# Patient Record
Sex: FEMALE | Race: WHITE | NOT HISPANIC OR LATINO | Employment: OTHER | URBAN - NONMETROPOLITAN AREA
[De-identification: names, ages, dates, MRNs, and addresses within clinical notes are randomized per-mention and may not be internally consistent; named-entity substitution may affect disease eponyms.]

---

## 2018-05-08 ENCOUNTER — PREP FOR SURGERY (OUTPATIENT)
Dept: OTHER | Facility: HOSPITAL | Age: 83
End: 2018-05-08

## 2018-05-08 ENCOUNTER — APPOINTMENT (OUTPATIENT)
Dept: GENERAL RADIOLOGY | Facility: HOSPITAL | Age: 83
End: 2018-05-08

## 2018-05-08 ENCOUNTER — HOSPITAL ENCOUNTER (INPATIENT)
Facility: HOSPITAL | Age: 83
LOS: 4 days | Discharge: NURSING, REHAB OR LONG-TERM CARE | End: 2018-05-12
Attending: INTERNAL MEDICINE | Admitting: INTERNAL MEDICINE

## 2018-05-08 ENCOUNTER — APPOINTMENT (OUTPATIENT)
Dept: ULTRASOUND IMAGING | Facility: HOSPITAL | Age: 83
End: 2018-05-08

## 2018-05-08 DIAGNOSIS — Z87.19: ICD-10-CM

## 2018-05-08 DIAGNOSIS — K83.8 COMMON BILE DUCT DILATATION: Primary | ICD-10-CM

## 2018-05-08 PROBLEM — A41.9 SEPSIS (HCC): Status: ACTIVE | Noted: 2018-05-08

## 2018-05-08 LAB
ALBUMIN SERPL-MCNC: 3.4 G/DL (ref 3.4–4.8)
ALBUMIN/GLOB SERPL: 0.9 G/DL (ref 1.5–2.5)
ALP SERPL-CCNC: 267 U/L (ref 35–104)
ALT SERPL W P-5'-P-CCNC: 61 U/L (ref 10–36)
ANION GAP SERPL CALCULATED.3IONS-SCNC: 8.2 MMOL/L (ref 3.6–11.2)
AST SERPL-CCNC: 60 U/L (ref 10–30)
BASOPHILS # BLD AUTO: 0.01 10*3/MM3 (ref 0–0.3)
BASOPHILS NFR BLD AUTO: 0.1 % (ref 0–2)
BILIRUB SERPL-MCNC: 0.6 MG/DL (ref 0.2–1.8)
BUN BLD-MCNC: 36 MG/DL (ref 7–21)
BUN/CREAT SERPL: 31.9 (ref 7–25)
CALCIUM SPEC-SCNC: 8.9 MG/DL (ref 7.7–10)
CHLORIDE SERPL-SCNC: 103 MMOL/L (ref 99–112)
CO2 SERPL-SCNC: 24.8 MMOL/L (ref 24.3–31.9)
CREAT BLD-MCNC: 1.13 MG/DL (ref 0.43–1.29)
CRP SERPL-MCNC: 9.47 MG/DL (ref 0–0.99)
D-LACTATE SERPL-SCNC: 0.9 MMOL/L (ref 0.5–2)
DEPRECATED RDW RBC AUTO: 49.8 FL (ref 37–54)
EOSINOPHIL # BLD AUTO: 0 10*3/MM3 (ref 0–0.7)
EOSINOPHIL NFR BLD AUTO: 0 % (ref 0–7)
ERYTHROCYTE [DISTWIDTH] IN BLOOD BY AUTOMATED COUNT: 14.1 % (ref 11.5–14.5)
FOLATE SERPL-MCNC: 12.63 NG/ML (ref 5.4–20)
GFR SERPL CREATININE-BSD FRML MDRD: 45 ML/MIN/1.73
GLOBULIN UR ELPH-MCNC: 4 GM/DL
GLUCOSE BLD-MCNC: 122 MG/DL (ref 70–110)
HCT VFR BLD AUTO: 32.6 % (ref 37–47)
HGB BLD-MCNC: 10.4 G/DL (ref 12–16)
IMM GRANULOCYTES # BLD: 0.06 10*3/MM3 (ref 0–0.03)
IMM GRANULOCYTES NFR BLD: 0.4 % (ref 0–0.5)
LYMPHOCYTES # BLD AUTO: 1.3 10*3/MM3 (ref 1–3)
LYMPHOCYTES NFR BLD AUTO: 9.2 % (ref 16–46)
MCH RBC QN AUTO: 32.1 PG (ref 27–33)
MCHC RBC AUTO-ENTMCNC: 31.9 G/DL (ref 33–37)
MCV RBC AUTO: 100.6 FL (ref 80–94)
MONOCYTES # BLD AUTO: 1.24 10*3/MM3 (ref 0.1–0.9)
MONOCYTES NFR BLD AUTO: 8.8 % (ref 0–12)
NEUTROPHILS # BLD AUTO: 11.47 10*3/MM3 (ref 1.4–6.5)
NEUTROPHILS NFR BLD AUTO: 81.5 % (ref 40–75)
OSMOLALITY SERPL CALC.SUM OF ELEC: 281.6 MOSM/KG (ref 273–305)
PLATELET # BLD AUTO: 328 10*3/MM3 (ref 130–400)
PMV BLD AUTO: 9.4 FL (ref 6–10)
POTASSIUM BLD-SCNC: 3.6 MMOL/L (ref 3.5–5.3)
PROT SERPL-MCNC: 7.4 G/DL (ref 6–8)
RBC # BLD AUTO: 3.24 10*6/MM3 (ref 4.2–5.4)
SODIUM BLD-SCNC: 136 MMOL/L (ref 135–153)
TSH SERPL DL<=0.05 MIU/L-ACNC: 1.33 MIU/ML (ref 0.55–4.78)
VIT B12 BLD-MCNC: 913 PG/ML (ref 211–911)
WBC NRBC COR # BLD: 14.08 10*3/MM3 (ref 4.5–12.5)

## 2018-05-08 PROCEDURE — 85025 COMPLETE CBC W/AUTO DIFF WBC: CPT | Performed by: INTERNAL MEDICINE

## 2018-05-08 PROCEDURE — 80053 COMPREHEN METABOLIC PANEL: CPT | Performed by: INTERNAL MEDICINE

## 2018-05-08 PROCEDURE — 93005 ELECTROCARDIOGRAM TRACING: CPT | Performed by: INTERNAL MEDICINE

## 2018-05-08 PROCEDURE — 99223 1ST HOSP IP/OBS HIGH 75: CPT | Performed by: INTERNAL MEDICINE

## 2018-05-08 PROCEDURE — 76700 US EXAM ABDOM COMPLETE: CPT

## 2018-05-08 PROCEDURE — 87040 BLOOD CULTURE FOR BACTERIA: CPT | Performed by: INTERNAL MEDICINE

## 2018-05-08 PROCEDURE — 76700 US EXAM ABDOM COMPLETE: CPT | Performed by: RADIOLOGY

## 2018-05-08 PROCEDURE — 84443 ASSAY THYROID STIM HORMONE: CPT | Performed by: INTERNAL MEDICINE

## 2018-05-08 PROCEDURE — 83605 ASSAY OF LACTIC ACID: CPT | Performed by: INTERNAL MEDICINE

## 2018-05-08 PROCEDURE — 82607 VITAMIN B-12: CPT | Performed by: INTERNAL MEDICINE

## 2018-05-08 PROCEDURE — 25010000002 PIPERACILLIN-TAZOBACTAM: Performed by: PHYSICIAN ASSISTANT

## 2018-05-08 PROCEDURE — 82746 ASSAY OF FOLIC ACID SERUM: CPT | Performed by: INTERNAL MEDICINE

## 2018-05-08 PROCEDURE — 86140 C-REACTIVE PROTEIN: CPT | Performed by: INTERNAL MEDICINE

## 2018-05-08 PROCEDURE — 71045 X-RAY EXAM CHEST 1 VIEW: CPT

## 2018-05-08 PROCEDURE — 93010 ELECTROCARDIOGRAM REPORT: CPT | Performed by: INTERNAL MEDICINE

## 2018-05-08 PROCEDURE — 71045 X-RAY EXAM CHEST 1 VIEW: CPT | Performed by: RADIOLOGY

## 2018-05-08 RX ORDER — ACETAMINOPHEN 325 MG/1
650 TABLET ORAL EVERY 6 HOURS PRN
Status: DISCONTINUED | OUTPATIENT
Start: 2018-05-08 | End: 2018-05-12 | Stop reason: HOSPADM

## 2018-05-08 RX ORDER — SODIUM CHLORIDE 9 MG/ML
75 INJECTION, SOLUTION INTRAVENOUS CONTINUOUS
Status: CANCELLED | OUTPATIENT
Start: 2018-05-08

## 2018-05-08 RX ORDER — NITROGLYCERIN 0.4 MG/1
0.4 TABLET SUBLINGUAL
Status: DISCONTINUED | OUTPATIENT
Start: 2018-05-08 | End: 2018-05-12 | Stop reason: HOSPADM

## 2018-05-08 RX ORDER — ACETAMINOPHEN 500 MG
1000 TABLET ORAL
COMMUNITY

## 2018-05-08 RX ORDER — NITROGLYCERIN 0.1MG/HR
1 PATCH, TRANSDERMAL 24 HOURS TRANSDERMAL DAILY
COMMUNITY

## 2018-05-08 RX ORDER — DOXEPIN HYDROCHLORIDE 25 MG/1
50 CAPSULE ORAL 2 TIMES DAILY
Status: DISCONTINUED | OUTPATIENT
Start: 2018-05-08 | End: 2018-05-12 | Stop reason: HOSPADM

## 2018-05-08 RX ORDER — NITROGLYCERIN 0.4 MG/1
0.4 TABLET SUBLINGUAL
Status: ON HOLD | COMMUNITY
End: 2018-05-08 | Stop reason: ALTCHOICE

## 2018-05-08 RX ORDER — LEVOTHYROXINE SODIUM 0.1 MG/1
100 TABLET ORAL DAILY
Status: DISCONTINUED | OUTPATIENT
Start: 2018-05-08 | End: 2018-05-12 | Stop reason: HOSPADM

## 2018-05-08 RX ORDER — ASPIRIN AND DIPYRIDAMOLE 25; 200 MG/1; MG/1
1 CAPSULE, EXTENDED RELEASE ORAL 2 TIMES DAILY
COMMUNITY

## 2018-05-08 RX ORDER — NITROGLYCERIN 0.1MG/HR
1 PATCH, TRANSDERMAL 24 HOURS TRANSDERMAL DAILY
Status: CANCELLED | OUTPATIENT
Start: 2018-05-08

## 2018-05-08 RX ORDER — ACETAMINOPHEN 650 MG/1
650 SUPPOSITORY RECTAL EVERY 4 HOURS PRN
Status: CANCELLED | OUTPATIENT
Start: 2018-05-08

## 2018-05-08 RX ORDER — HYDROCODONE BITARTRATE AND ACETAMINOPHEN 10; 325 MG/1; MG/1
1 TABLET ORAL 2 TIMES DAILY
Status: CANCELLED | OUTPATIENT
Start: 2018-05-08

## 2018-05-08 RX ORDER — DOCUSATE SODIUM 100 MG/1
100 CAPSULE, LIQUID FILLED ORAL DAILY
Status: CANCELLED | OUTPATIENT
Start: 2018-05-08

## 2018-05-08 RX ORDER — CARVEDILOL 3.12 MG/1
3.12 TABLET ORAL 2 TIMES DAILY
COMMUNITY

## 2018-05-08 RX ORDER — HEPARIN SODIUM 5000 [USP'U]/ML
5000 INJECTION, SOLUTION INTRAVENOUS; SUBCUTANEOUS EVERY 12 HOURS SCHEDULED
Status: DISCONTINUED | OUTPATIENT
Start: 2018-05-08 | End: 2018-05-08

## 2018-05-08 RX ORDER — ASPIRIN AND DIPYRIDAMOLE 25; 200 MG/1; MG/1
1 CAPSULE, EXTENDED RELEASE ORAL 2 TIMES DAILY
Status: CANCELLED | OUTPATIENT
Start: 2018-05-08

## 2018-05-08 RX ORDER — FERROUS SULFATE 325(65) MG
325 TABLET ORAL 2 TIMES DAILY
Status: DISCONTINUED | OUTPATIENT
Start: 2018-05-08 | End: 2018-05-09

## 2018-05-08 RX ORDER — POVIDONE-IODINE 10 MG/G
OINTMENT TOPICAL EVERY 12 HOURS SCHEDULED
Status: DISCONTINUED | OUTPATIENT
Start: 2018-05-08 | End: 2018-05-12 | Stop reason: HOSPADM

## 2018-05-08 RX ORDER — SODIUM CHLORIDE 9 MG/ML
75 INJECTION, SOLUTION INTRAVENOUS CONTINUOUS
Status: DISCONTINUED | OUTPATIENT
Start: 2018-05-08 | End: 2018-05-12 | Stop reason: HOSPADM

## 2018-05-08 RX ORDER — FUROSEMIDE 40 MG/1
40 TABLET ORAL 2 TIMES DAILY
Status: CANCELLED | OUTPATIENT
Start: 2018-05-08

## 2018-05-08 RX ORDER — ACETAMINOPHEN 500 MG
1000 TABLET ORAL NIGHTLY
Status: CANCELLED | OUTPATIENT
Start: 2018-05-08

## 2018-05-08 RX ORDER — CARVEDILOL 3.12 MG/1
3.12 TABLET ORAL 2 TIMES DAILY
Status: CANCELLED | OUTPATIENT
Start: 2018-05-08

## 2018-05-08 RX ORDER — SODIUM CHLORIDE 0.9 % (FLUSH) 0.9 %
1-10 SYRINGE (ML) INJECTION AS NEEDED
Status: DISCONTINUED | OUTPATIENT
Start: 2018-05-08 | End: 2018-05-12 | Stop reason: HOSPADM

## 2018-05-08 RX ORDER — ACETAMINOPHEN 650 MG/1
650 SUPPOSITORY RECTAL EVERY 4 HOURS PRN
Status: DISCONTINUED | OUTPATIENT
Start: 2018-05-08 | End: 2018-05-12 | Stop reason: HOSPADM

## 2018-05-08 RX ORDER — CASTOR OIL AND BALSAM, PERU 788; 87 MG/G; MG/G
OINTMENT TOPICAL EVERY 12 HOURS SCHEDULED
Status: DISCONTINUED | OUTPATIENT
Start: 2018-05-08 | End: 2018-05-12 | Stop reason: HOSPADM

## 2018-05-08 RX ORDER — FUROSEMIDE 40 MG/1
40 TABLET ORAL 2 TIMES DAILY
COMMUNITY

## 2018-05-08 RX ORDER — NITROGLYCERIN 0.1MG/HR
1 PATCH, TRANSDERMAL 24 HOURS TRANSDERMAL DAILY
Status: DISCONTINUED | OUTPATIENT
Start: 2018-05-08 | End: 2018-05-12 | Stop reason: HOSPADM

## 2018-05-08 RX ORDER — ASPIRIN AND DIPYRIDAMOLE 25; 200 MG/1; MG/1
1 CAPSULE, EXTENDED RELEASE ORAL 2 TIMES DAILY
Status: DISCONTINUED | OUTPATIENT
Start: 2018-05-08 | End: 2018-05-12 | Stop reason: HOSPADM

## 2018-05-08 RX ORDER — L.ACID,PARA/B.BIFIDUM/S.THERM 8B CELL
1 CAPSULE ORAL DAILY
Status: DISCONTINUED | OUTPATIENT
Start: 2018-05-08 | End: 2018-05-12 | Stop reason: HOSPADM

## 2018-05-08 RX ORDER — FERROUS SULFATE 325(65) MG
325 TABLET ORAL 2 TIMES DAILY
Status: CANCELLED | OUTPATIENT
Start: 2018-05-08

## 2018-05-08 RX ORDER — CARVEDILOL 3.12 MG/1
3.12 TABLET ORAL 2 TIMES DAILY WITH MEALS
Status: DISCONTINUED | OUTPATIENT
Start: 2018-05-08 | End: 2018-05-12 | Stop reason: HOSPADM

## 2018-05-08 RX ORDER — DOXEPIN HYDROCHLORIDE 50 MG/1
50 CAPSULE ORAL 2 TIMES DAILY
COMMUNITY

## 2018-05-08 RX ORDER — NITROGLYCERIN 400 UG/1
1 SPRAY ORAL
COMMUNITY

## 2018-05-08 RX ORDER — FUROSEMIDE 40 MG/1
40 TABLET ORAL
Status: DISCONTINUED | OUTPATIENT
Start: 2018-05-08 | End: 2018-05-12 | Stop reason: HOSPADM

## 2018-05-08 RX ORDER — NITROGLYCERIN 0.4 MG/1
0.4 TABLET SUBLINGUAL
Status: CANCELLED | OUTPATIENT
Start: 2018-05-08

## 2018-05-08 RX ORDER — FERROUS SULFATE 325(65) MG
325 TABLET ORAL 2 TIMES DAILY
COMMUNITY

## 2018-05-08 RX ORDER — SODIUM CHLORIDE 0.9 % (FLUSH) 0.9 %
1-10 SYRINGE (ML) INJECTION AS NEEDED
Status: CANCELLED | OUTPATIENT
Start: 2018-05-08

## 2018-05-08 RX ORDER — DOCUSATE SODIUM 100 MG/1
100 CAPSULE, LIQUID FILLED ORAL 2 TIMES DAILY
Status: DISCONTINUED | OUTPATIENT
Start: 2018-05-08 | End: 2018-05-12 | Stop reason: HOSPADM

## 2018-05-08 RX ORDER — HYDROCODONE BITARTRATE AND ACETAMINOPHEN 10; 325 MG/1; MG/1
1 TABLET ORAL 2 TIMES DAILY PRN
Status: DISCONTINUED | OUTPATIENT
Start: 2018-05-08 | End: 2018-05-12 | Stop reason: HOSPADM

## 2018-05-08 RX ORDER — DOXEPIN HYDROCHLORIDE 25 MG/1
50 CAPSULE ORAL 2 TIMES DAILY
Status: CANCELLED | OUTPATIENT
Start: 2018-05-08

## 2018-05-08 RX ORDER — LEVOTHYROXINE SODIUM 0.1 MG/1
100 TABLET ORAL DAILY
COMMUNITY

## 2018-05-08 RX ORDER — HEPARIN SODIUM 5000 [USP'U]/ML
5000 INJECTION, SOLUTION INTRAVENOUS; SUBCUTANEOUS EVERY 12 HOURS SCHEDULED
Status: CANCELLED | OUTPATIENT
Start: 2018-05-08

## 2018-05-08 RX ORDER — DOCUSATE SODIUM 100 MG/1
100 CAPSULE, LIQUID FILLED ORAL DAILY
COMMUNITY

## 2018-05-08 RX ORDER — LEVOTHYROXINE SODIUM 0.1 MG/1
100 TABLET ORAL DAILY
Status: CANCELLED | OUTPATIENT
Start: 2018-05-08

## 2018-05-08 RX ORDER — HYDROCODONE BITARTRATE AND ACETAMINOPHEN 10; 325 MG/1; MG/1
1 TABLET ORAL 2 TIMES DAILY
COMMUNITY

## 2018-05-08 RX ADMIN — ASPIRIN AND DIPYRIDAMOLE 1 CAPSULE: 25; 200 CAPSULE, EXTENDED RELEASE ORAL at 11:18

## 2018-05-08 RX ADMIN — LEVOTHYROXINE SODIUM 100 MCG: 100 TABLET ORAL at 11:18

## 2018-05-08 RX ADMIN — FUROSEMIDE 40 MG: 40 TABLET ORAL at 17:18

## 2018-05-08 RX ADMIN — CARVEDILOL 3.12 MG: 3.12 TABLET, FILM COATED ORAL at 17:18

## 2018-05-08 RX ADMIN — ASPIRIN AND DIPYRIDAMOLE 1 CAPSULE: 25; 200 CAPSULE, EXTENDED RELEASE ORAL at 20:20

## 2018-05-08 RX ADMIN — POVIDONE-IODINE: 100 OINTMENT TOPICAL at 20:20

## 2018-05-08 RX ADMIN — SODIUM CHLORIDE 75 ML/HR: 9 INJECTION, SOLUTION INTRAVENOUS at 20:20

## 2018-05-08 RX ADMIN — SODIUM CHLORIDE 75 ML/HR: 9 INJECTION, SOLUTION INTRAVENOUS at 18:16

## 2018-05-08 RX ADMIN — FUROSEMIDE 40 MG: 40 TABLET ORAL at 11:18

## 2018-05-08 RX ADMIN — POVIDONE-IODINE: 100 OINTMENT TOPICAL at 11:19

## 2018-05-08 RX ADMIN — DOCUSATE SODIUM 100 MG: 100 CAPSULE, LIQUID FILLED ORAL at 20:20

## 2018-05-08 RX ADMIN — CARVEDILOL 3.12 MG: 3.12 TABLET, FILM COATED ORAL at 11:17

## 2018-05-08 RX ADMIN — FERROUS SULFATE TAB 325 MG (65 MG ELEMENTAL FE) 325 MG: 325 (65 FE) TAB at 20:20

## 2018-05-08 RX ADMIN — DOXEPIN HYDROCHLORIDE 50 MG: 25 CAPSULE ORAL at 11:18

## 2018-05-08 RX ADMIN — CASTOR OIL AND BALSAM, PERU: 788; 87 OINTMENT TOPICAL at 11:19

## 2018-05-08 RX ADMIN — DOCUSATE SODIUM 100 MG: 100 CAPSULE, LIQUID FILLED ORAL at 11:18

## 2018-05-08 RX ADMIN — Medication 1 CAPSULE: at 17:18

## 2018-05-08 RX ADMIN — PIPERACILLIN SODIUM,TAZOBACTAM SODIUM 3.38 G: 3; .375 INJECTION, POWDER, FOR SOLUTION INTRAVENOUS at 20:20

## 2018-05-08 RX ADMIN — PIPERACILLIN SODIUM,TAZOBACTAM SODIUM 3.38 G: 3; .375 INJECTION, POWDER, FOR SOLUTION INTRAVENOUS at 14:30

## 2018-05-08 RX ADMIN — FERROUS SULFATE TAB 325 MG (65 MG ELEMENTAL FE) 325 MG: 325 (65 FE) TAB at 11:18

## 2018-05-08 RX ADMIN — HYDROCODONE BITARTRATE AND ACETAMINOPHEN 1 TABLET: 10; 325 TABLET ORAL at 20:20

## 2018-05-08 RX ADMIN — CASTOR OIL AND BALSAM, PERU: 788; 87 OINTMENT TOPICAL at 20:20

## 2018-05-08 RX ADMIN — SODIUM CHLORIDE 75 ML/HR: 9 INJECTION, SOLUTION INTRAVENOUS at 06:13

## 2018-05-08 RX ADMIN — NITROGLYCERIN 1 PATCH: 0.1 PATCH TRANSDERMAL at 08:46

## 2018-05-08 RX ADMIN — DOXEPIN HYDROCHLORIDE 50 MG: 25 CAPSULE ORAL at 20:20

## 2018-05-08 NOTE — PLAN OF CARE
Problem: Patient Care Overview  Goal: Plan of Care Review  Outcome: Ongoing (interventions implemented as appropriate)   05/08/18 0319   Coping/Psychosocial   Plan of Care Reviewed With patient;other (see comments)   Plan of Care Review   Progress no change       Problem: Fall Risk (Adult)  Goal: Identify Related Risk Factors and Signs and Symptoms  Outcome: Ongoing (interventions implemented as appropriate)    Goal: Absence of Fall  Outcome: Ongoing (interventions implemented as appropriate)      Problem: Skin Injury Risk (Adult)  Goal: Identify Related Risk Factors and Signs and Symptoms  Outcome: Ongoing (interventions implemented as appropriate)    Goal: Skin Health and Integrity  Outcome: Ongoing (interventions implemented as appropriate)      Problem: Infection, Risk/Actual (Adult)  Goal: Identify Related Risk Factors and Signs and Symptoms  Outcome: Ongoing (interventions implemented as appropriate)    Goal: Infection Prevention/Resolution  Outcome: Ongoing (interventions implemented as appropriate)

## 2018-05-08 NOTE — PROGRESS NOTES
Discharge Planning Assessment   Josias     Patient Name: Zoe Thakkar  MRN: [id number]  Today's Date: 5/8/2018    Admit Date: 5/8/2018          Discharge Needs Assessment     Row Name 05/08/18 1143       Discharge Needs Assessment    Discharge Facility/Level of Care Needs --   SS contacted [facility] [phone] per Travon who states pt has a bed hold until further notice. Travon at [facility] states pt's insurance does not require a pre-cert. at this time, therefore PT/OT consults are not needed.             Discharge Plan     Row Name 05/08/18 1144       Plan    Plan Pt was admitted from [facility] and has a bed hold until further notice. SS to follow and assist as needed with discharge planning.           Demographic Summary     Row Name 05/08/18 1143       General Information    Referral Source nursing    Reason for Consult --   SS received consult DC planning; 89 y/o. SS spoke to friend who states pt was admitted from [facility].      Tammi Mccain

## 2018-05-08 NOTE — PROGRESS NOTES
[facility] HOSPITALIST PROGRESS NOTE     Patient Identification:  Name:  Zoe Thakkar  Age:  89 y.o.  Sex:  female  :  4/15/1926  MRN:  [id number]  Visit Number:  [id number]  Primary Care Provider:  Flako Webster MD    Length of stay:  0    Chief complaint:  89 y.o. old female admitted with No chief complaint on file.          Subjective:    H&P reviewed.  89-year-old female admitted with fever and sepsis.  Patient states that she's been having fever for last 3-4 days and is also complaining of cough which is dry.  Patient denies any other complaints.         Current Hospital Meds:    aspirin-dipyridamole 1 capsule Oral BID   carvedilol 3.125 mg Oral BID With Meals   castor oil-balsam peru  Topical Q12H   docusate sodium 100 mg Oral BID   doxepin 50 mg Oral BID   ferrous sulfate 325 mg Oral BID   furosemide 40 mg Oral BID   lactobacillus acidophilus 1 capsule Oral Daily   levothyroxine 100 mcg Oral Daily   nitroglycerin 1 patch Transdermal Daily   piperacillin-tazobactam 3.375 g Intravenous Q8H   povidone-iodine  Topical Q12H       sodium chloride 75 mL/hr Last Rate: 75 mL/hr (18)     ----------------------------------------------------------------------------------------------------------------------  Vital Signs:  Temp:  [98.5 °F (36.9 °C)-99.5 °F (37.5 °C)] 98.5 °F (36.9 °C)  Heart Rate:  [72-78] 72  Resp:  [20] 20  BP: (130-146)/(49-62) 131/54  1    18  0304   Weight: 65.8 kg (145 lb 1.6 oz)     Body mass index is 24.91 kg/m².  No intake or output data in the 24 hours ending 18 1840  NPO Diet  ----------------------------------------------------------------------------------------------------------------------  Physical exam:  Constitutional:  Well-developed and well-nourished.   Elderly female lying in bed in no acute distress.  HENT:  Head:  Normocephalic and atraumatic.  Mouth:  Moist mucous membranes.    Eyes:  Conjunctivae and EOM are normal.  Pupils are equal,  round, and reactive to light.   Neck:  Neck supple.  No JVD present.    Cardiovascular:  Regular rate and rhythm. S1+S2. No murmur, rubs or gallops.   Pulmonary/Chest: Clear to auscultation bilaterally.   Abdominal:  Soft.  Mild tenderness to palpation in mid abdomen and barium plical area.. No viscera palpable.  Bowel sounds audible.   Musculoskeletal: No deformity or joint swelling.   Peripheral vascular: Bilateral dorsalis pedis palpable. No edema.   Neurological:  Alert and oriented to person, place, and time.  Cranial nerves grossly intact. Strength bilaterally symmetrical in upper and lower extremities.   Skin:  Skin is warm and dry. No rash noted. No pallor.   ----------------------------------------------------------------------------------------------------------------------  Tele:    ----------------------------------------------------------------------------------------------------------------------        Results from last 7 days  Lab Units 05/08/18  0513 05/08/18  0246   CRP mg/dL 9.47*  --    LACTATE mmol/L  --  0.9   WBC 10*3/mm3  --  14.08*   HEMOGLOBIN g/dL  --  10.4*   HEMATOCRIT %  --  32.6*   MCV fL  --  100.6*   MCHC g/dL  --  31.9*   PLATELETS 10*3/mm3  --  328           Results from last 7 days  Lab Units 05/08/18  0245   SODIUM mmol/L 136   POTASSIUM mmol/L 3.6   CHLORIDE mmol/L 103   CO2 mmol/L 24.8   BUN mg/dL 36*   CREATININE mg/dL 1.13   EGFR IF NONAFRICN AM mL/min/1.73 45*   CALCIUM mg/dL 8.9   GLUCOSE mg/dL 122*   ALBUMIN g/dL 3.40   BILIRUBIN mg/dL 0.6   ALK PHOS U/L 267*   AST (SGOT) U/L 60*   ALT (SGPT) U/L 61*   Estimated Creatinine Clearance: 29.6 mL/min (by C-G formula based on SCr of 1.13 mg/dL).    No results found for: AMMONIA      Blood Culture   Date Value Ref Range Status   05/08/2018 No growth at less than 24 hours  Preliminary   05/08/2018 No growth at less than 24 hours  Preliminary                I have personally looked at the labs and they are summarized  above.  ----------------------------------------------------------------------------------------------------------------------  Imaging Results (last 24 hours)     Procedure Component Value Units Date/Time    US Abdomen Complete [[id number]] Collected:  05/08/18 1058     Updated:  05/08/18 1105    Narrative:       US ABDOMEN COMPLETE-      HISTORY:   Epigastric pain, fever, history of cholangitis          COMPARISON: None available.     FINDINGS: Sonographic imaging of the abdomen shows no evidence of a  pancreatic mass.     The liver is homogeneous in echotexture. There is no intrahepatic ductal  dilatation or focal hepatic mass. Shadowing from the gallbladder fossa  appears to be due to air. The patient has had previous cholecystectomy.     The abdominal aorta does not appear to be dilated. Common bile duct is 1  cm.     Kidneys show no hydronephrosis and the spleen is homogeneous.       Impression:       Common bile duct is prominent.      This report was finalized on 5/8/2018 10:59 AM by Dr. Jassi Babcock MD.           ----------------------------------------------------------------------------------------------------------------------  Assessment and Plan:    Sepsis  Unclear etiology, improved.  Patient is afebrile and VS stable.  Continue Zosyn.  Follow up on culture results.  Will check a chest x-ray and urinalysis.  WBC and CRP is elevated I will continue to monitor inflammatory markers.    LFT elevation  Ultrasound of the liver showed no acute abnormality.  Patient has previous history of; [place] with ERCP and stent placement.  GI consulted and appreciate help from gastroenterology.    Essential hypertension  Controlled.  Continue Coreg and continue to monitor blood pressure.    History of CHF, combined systolic and diastolic  Compensated.  Continue beta blocker and Lasix.  Monitor IS/O and daily weights.    Cerebrovascular disease  Continue Aggrenox and statin.    Prophylaxis  DVT: Heparin  GI: PPI.     The  patient is high risk due to: Sepsis, LFT elevation, CHF    I discussed the patients findings and my recommendations with patient and nursing staff.    Cj Gaines MD  05/08/18  6:40 PM

## 2018-05-08 NOTE — PLAN OF CARE
Problem: Patient Care Overview  Goal: Plan of Care Review  Outcome: Ongoing (interventions implemented as appropriate)   05/08/18 0319 05/08/18 0800   Coping/Psychosocial   Plan of Care Reviewed With --  patient;friend   Plan of Care Review   Progress no change --      Goal: Discharge Needs Assessment  Outcome: Ongoing (interventions implemented as appropriate)      Problem: Fall Risk (Adult)  Goal: Identify Related Risk Factors and Signs and Symptoms  Outcome: Ongoing (interventions implemented as appropriate)   05/08/18 0319   Fall Risk (Adult)   Related Risk Factors (Fall Risk) age-related changes;fatigue/slow reaction;gait/mobility problems;history of falls;impaired vision   Signs and Symptoms (Fall Risk) presence of risk factors     Goal: Absence of Fall  Outcome: Ongoing (interventions implemented as appropriate)   05/08/18 1147   Fall Risk (Adult)   Absence of Fall making progress toward outcome       Problem: Skin Injury Risk (Adult)  Goal: Identify Related Risk Factors and Signs and Symptoms  Outcome: Ongoing (interventions implemented as appropriate)   05/08/18 0319   Skin Injury Risk (Adult)   Related Risk Factors (Skin Injury Risk) advanced age;infection;tissue perfusion altered     Goal: Skin Health and Integrity  Outcome: Ongoing (interventions implemented as appropriate)   05/08/18 1147   Skin Injury Risk (Adult)   Skin Health and Integrity making progress toward outcome       Problem: Infection, Risk/Actual (Adult)  Goal: Identify Related Risk Factors and Signs and Symptoms  Outcome: Ongoing (interventions implemented as appropriate)   05/08/18 0319   Infection, Risk/Actual (Adult)   Related Risk Factors (Infection, Risk/Actual) age extremes;chronic illness/condition;tissue perfusion altered;skin integrity impairment   Signs and Symptoms (Infection, Risk/Actual) body temperature changes;chills;lab value changes;nausea     Goal: Infection Prevention/Resolution  Outcome: Ongoing (interventions  implemented as appropriate)   05/08/18 1143   Infection, Risk/Actual (Adult)   Infection Prevention/Resolution making progress toward outcome

## 2018-05-08 NOTE — PLAN OF CARE
Problem: Patient Care Overview  Goal: Discharge Needs Assessment  Outcome: Ongoing (interventions implemented as appropriate)  Discharge Planning Assessment   Josias     Patient Name: Zoe Thakkar  MRN: [id number]  Today's Date: 5/8/2018    Admit Date: 5/8/2018          Discharge Needs Assessment     Row Name 05/08/18 1143       Discharge Needs Assessment    Discharge Facility/Level of Care Needs --   SS contacted [facility] [phone] per Travon who states pt has a bed hold until further notice. Travon at [facility] states pt's insurance does not require a pre-cert. at this time, therefore PT/OT consults are not needed.             Discharge Plan     Row Name 05/08/18 1140       Plan    Plan Pt was admitted from [facility] and has a bed hold until further notice. SS to follow and assist as needed with discharge planning.           Demographic Summary     Row Name 05/08/18 1142       General Information    Referral Source nursing    Reason for Consult --   SS received consult DC planning; 89 y/o. SS spoke to friend who states pt was admitted from [facility].      Tammi Mccain

## 2018-05-08 NOTE — H&P
Hospitalist History and Physical    Patient Identification:  Name: Zoe Thakkar  Age/Sex: 89 y.o. female  :  4/15/1926  MRN: [id number]         Primary Care Physician: Flako Webster MD     No chief complaint on file.      v      Patient is a 89 y.o. female presents with the following: fever    The patient is a very pleasant 89-year-old female with past medical history significant for ascending cholangitis with ERCP by Dr. Kurtz approximately 1 year ago, CAD, Combined CHF and TIA who presents in transfer from [facility] with fever, slightly elevated LFTs and concern for underlying GI pathology such as possible recurrent cholangitis.    The patient reports a one-day history of fever.  Her maximum temperature at [facility] was 102°F.  She reports occasional intermittent nausea.  She denies vomiting.  She denies diarrhea.  She complains of constipation.  She denies abdominal pain.    Upon further questioning, the patient reports nonproductive cough.  She denies any dysphasia.  She denies chest pain and/or dyspnea.  She denies palpitations.  She denies any urinary symptoms.    Pertinent laboratory studies from [facility]:  WBC 15.4, Hgb 11.0, Hct 35.3, Plt 357  BUN 48, Creatinine 1.3  ALT 46, AST 52, Alkphos 305, Total bilirubin 0.3, Lipase 17    UA was unremarkable. EKG revealed NSR with a prolonged DE interval (228). No acute ST-T changes; nonspecific ST changes laterally.     1 view chest xray was reviewed: no obvious infiltrate and/or effusion per my view    Present during visit: CHENTE Zarate and the patient's friend, Nina    Past History:  Past Medical History:   Diagnosis Date   • Acute myocardial infarction    • Anemia    • Ascending cholangitis    • Auricular fibrillation    • Calculus bile duct w/obstruction and w/o cholangitis or cholecystitis    • Cancer     History of breast cancer   • CHF (congestive heart failure)    • Cirrhosis of liver    • Coronary artery disease    •  Heart failure    • Hypertension    • Hypothyroidism    • TIA (transient ischemic attack)    • Vitamin D deficiency      Past Surgical History:   Procedure Laterality Date   • BREAST SURGERY     • CARDIAC DEFIBRILLATOR PLACEMENT     • CAROTID ENDARTERECTOMY     • CHOLECYSTECTOMY     • ERCP W/ METAL STENT PLACEMENT      Unsure if metal or plastic   • PACEMAKER IMPLANTATION       History reviewed. No pertinent family history.  Social History   Substance Use Topics   • Smoking status: Never Smoker   • Smokeless tobacco: Never Used   • Alcohol use Not on file     Prescriptions Prior to Admission   Medication Sig Dispense Refill Last Dose   • acetaminophen (TYLENOL) 500 MG tablet Take 1,000 mg by mouth every night at bedtime.   5/6/2018 at 2000   • aspirin-dipyridamole (AGGRENOX)  MG per 12 hr capsule Take 1 capsule by mouth 2 (Two) Times a Day.   5/7/2018 at 0800   • carvedilol (COREG) 3.125 MG tablet Take 3.125 mg by mouth 2 (Two) Times a Day.   5/7/2018 at 0800   • docusate sodium (COLACE) 100 MG capsule Take 100 mg by mouth Daily.   5/7/2018 at 0800   • doxepin (SINEquan) 50 MG capsule Take 50 mg by mouth 2 (Two) Times a Day.   5/7/2018 at 0800   • ferrous sulfate 325 (65 FE) MG tablet Take 325 mg by mouth 2 (Two) Times a Day.   5/7/2018 at 0800   • furosemide (LASIX) 40 MG tablet Take 40 mg by mouth 2 (Two) Times a Day.   5/7/2018 at 0800   • HYDROcodone-acetaminophen (NORCO)  MG per tablet Take 1 tablet by mouth 2 (Two) Times a Day.   5/7/2018 at 0800   • levothyroxine (SYNTHROID, LEVOTHROID) 100 MCG tablet Take 100 mcg by mouth Daily.   5/7/2018 at 0800   • nitroglycerin (NITRODUR) 0.1 MG/HR patch Place 1 patch on the skin Daily. Remove patch at bedtime   5/7/2018 at 0800   • nitroglycerin (NITROLINGUAL) 0.4 MG/SPRAY spray Place 1 spray under the tongue Every 5 (Five) Minutes As Needed for Chest Pain.   Unknown at Unknown time     Allergies: Tuberculin tests    Review of Systems:  Review of Systems    Constitutional: Positive for fever. Negative for chills and diaphoresis.   HENT: Negative for hearing loss, tinnitus and trouble swallowing.    Eyes: Negative for photophobia, discharge and visual disturbance.   Respiratory: Negative for cough, shortness of breath and wheezing.    Cardiovascular: Negative for chest pain, palpitations and leg swelling.   Gastrointestinal: Negative for abdominal pain, constipation, diarrhea, nausea and vomiting.   Endocrine: Negative for polydipsia, polyphagia and polyuria.   Genitourinary: Negative for dysuria, frequency and hematuria.   Musculoskeletal: Negative for gait problem, myalgias and neck pain.   Skin: Negative for rash and wound.   Neurological: Negative for dizziness, tremors, seizures, syncope, weakness and light-headedness.   Hematological: Does not bruise/bleed easily.   Psychiatric/Behavioral: Negative for confusion, hallucinations and suicidal ideas.      Vital Signs  Temp:  [98.9 °F (37.2 °C)-99.5 °F (37.5 °C)] 98.9 °F (37.2 °C)  Heart Rate:  [73-78] 73  Resp:  [20] 20  BP: (138-146)/(49-62) 138/62  Body mass index is 24.91 kg/m².    Physical Exam:  Physical Exam   Constitutional: She is oriented to person, place, and time. She appears well-developed and well-nourished. No distress.   HENT:   Head: Normocephalic and atraumatic.   Mouth/Throat: Oropharynx is clear and moist.   Eyes: Conjunctivae and EOM are normal. Pupils are equal, round, and reactive to light.   Neck: Neck supple. No tracheal deviation present. No thyromegaly present.   Cardiovascular: Normal rate and regular rhythm.  Exam reveals no gallop and no friction rub.    No murmur heard.  Pulmonary/Chest: Breath sounds normal. No respiratory distress. She has no wheezes. She has no rales.   Abdominal: Soft. Bowel sounds are normal. She exhibits no distension. There is tenderness in the epigastric area. There is no guarding.   Musculoskeletal: Normal range of motion. She exhibits no edema.    Neurological: She is alert and oriented to person, place, and time. No cranial nerve deficit.   Skin: Skin is warm and dry. No rash noted. No erythema.   Psychiatric: She has a normal mood and affect.      Results Review:    Results from last 7 days  Lab Units 05/08/18  0246   WBC 10*3/mm3 14.08*   HEMOGLOBIN g/dL 10.4*   HEMATOCRIT % 32.6*   PLATELETS 10*3/mm3 328       Results from last 7 days  Lab Units 05/08/18  0245   SODIUM mmol/L 136   POTASSIUM mmol/L 3.6   CHLORIDE mmol/L 103   CO2 mmol/L 24.8   BUN mg/dL 36*   CREATININE mg/dL 1.13   CALCIUM mg/dL 8.9   GLUCOSE mg/dL 122*       Results from last 7 days  Lab Units 05/08/18  0245   BILIRUBIN mg/dL 0.6   ALK PHOS U/L 267*   AST (SGOT) U/L 60*   ALT (SGPT) U/L 61*       Results from last 7 days  Lab Units 05/08/18  0513   CRP mg/dL 9.47*     Imaging:    I have personally reviewed the EKG. Per my view: NSR with a 1st degree AV block; No acute ST-T changes    Assessment/Plan     -Sepsis that was present upon admission with fever and leukocytosis; unclear etiololgy  -Mild LFT elevation  -Alkaline phosphatase elevation  -Macrocytic anemia with unknown baseline  -Previous history of ascending chlangitis with ERCP and stent placement  -Essential hypertension, controlled  -History of CAD with no complaints of chest pain  -History of CHF, combined dysfunction; appears euvolemic  -History of liver disease  -Hypothyroidism    The patient has been admitted to the medical surgical floor with telemetry.  She is currently nothing by mouth and is receiving gentle IV fluid hydration.  I have ordered an abdominal ultrasound and have placed a consult for gastroenterology.    Blood cultures have been ordered.  The patient has been started on IV Zosyn while awaiting final results.    Given the patient's macrocytosis, have ordered a vitamin B-12, folate and TSH level.    Patient's home medications have been reviewed and reconciled.    I will repeat her CBC, CMP and CRP in the  morning.  Further management is pending the patient's clinical course and results of above mentioned studies.    DVT prophylaxis: SCDs    Estimated Length of Stay: > 2 MNs    [facility]    The patient is considered to be high risk due to: sepsis, cad, chf, advanced age    CODE STATUS: DNR/DNI; antibiotics, blood products, antiarrhythmics, vasopressors, BIPAP/CPAP and temporary feeding tubes are permitted (Discussed with patient, friend Nina Ruiz and CHENTE Zarate)    I discussed the patients findings and my recommendations with patient, friend and nursing staff      Aviva Gonzalez DO  05/08/18  8:42 AM

## 2018-05-08 NOTE — NURSING NOTE
unstageable pressure injury to left heel and foot  Stage 1 pressure injury to coccyx               05/08/18 0900   Wound 05/08/18 0300 Left posterior heel pressure injury   Date first assessed/Time first assessed: 05/08/18 0300   Present On Admission : yes;picture taken  Side: Left  Orientation: posterior  Location: heel  Type: pressure injury  Stage, Pressure Injury: unstageable   Dressing Appearance open to air   Base necrotic;dry   Periwound intact;dry   Wound length (cm) 3.5 cm   Wound width (cm) 3 cm   Wound 05/08/18 0300 Left foot pressure injury   Date first assessed/Time first assessed: 05/08/18 0300   Present On Admission : yes;picture taken  Side: Left  Location: foot  Type: pressure injury  Stage, Pressure Injury: unstageable   Dressing Appearance open to air   Base dry;necrotic   Wound length (cm) 1 cm   Wound width (cm) 1 cm   Wound 05/08/18 0300 upper coccyx pressure injury   Date first assessed/Time first assessed: 05/08/18 0300   Present On Admission : picture taken;yes  Orientation: upper  Location: coccyx  Type: pressure injury   Dressing Appearance open to air   Base dry   Red (%), Wound Tissue Color 100   Wound length (cm) 3 cm   Wound width (cm) 2 cm

## 2018-05-09 ENCOUNTER — ANESTHESIA EVENT (OUTPATIENT)
Dept: PERIOP | Facility: HOSPITAL | Age: 83
End: 2018-05-09

## 2018-05-09 PROBLEM — K83.8 COMMON BILE DUCT DILATATION: Status: ACTIVE | Noted: 2018-05-07

## 2018-05-09 PROBLEM — Z87.19: Status: ACTIVE | Noted: 2018-05-07

## 2018-05-09 LAB
ALBUMIN SERPL-MCNC: 3.4 G/DL (ref 3.4–4.8)
ALBUMIN/GLOB SERPL: 0.9 G/DL (ref 1.5–2.5)
ALP SERPL-CCNC: 217 U/L (ref 35–104)
ALT SERPL W P-5'-P-CCNC: 45 U/L (ref 10–36)
ANION GAP SERPL CALCULATED.3IONS-SCNC: 9.1 MMOL/L (ref 3.6–11.2)
AST SERPL-CCNC: 33 U/L (ref 10–30)
BACTERIA UR QL AUTO: ABNORMAL /HPF
BASOPHILS # BLD AUTO: 0 10*3/MM3 (ref 0–0.3)
BASOPHILS NFR BLD AUTO: 0 % (ref 0–2)
BILIRUB SERPL-MCNC: 0.4 MG/DL (ref 0.2–1.8)
BILIRUB UR QL STRIP: NEGATIVE
BUN BLD-MCNC: 34 MG/DL (ref 7–21)
BUN/CREAT SERPL: 27.6 (ref 7–25)
CALCIUM SPEC-SCNC: 8.8 MG/DL (ref 7.7–10)
CHLORIDE SERPL-SCNC: 107 MMOL/L (ref 99–112)
CLARITY UR: CLEAR
CO2 SERPL-SCNC: 26.9 MMOL/L (ref 24.3–31.9)
COLOR UR: YELLOW
CREAT BLD-MCNC: 1.23 MG/DL (ref 0.43–1.29)
CRP SERPL-MCNC: 10.5 MG/DL (ref 0–0.99)
DEPRECATED RDW RBC AUTO: 48.7 FL (ref 37–54)
EOSINOPHIL # BLD AUTO: 0 10*3/MM3 (ref 0–0.7)
EOSINOPHIL NFR BLD AUTO: 0 % (ref 0–7)
ERYTHROCYTE [DISTWIDTH] IN BLOOD BY AUTOMATED COUNT: 14 % (ref 11.5–14.5)
FOLATE SERPL-MCNC: 16.38 NG/ML (ref 5.4–20)
GFR SERPL CREATININE-BSD FRML MDRD: 41 ML/MIN/1.73
GLOBULIN UR ELPH-MCNC: 3.7 GM/DL
GLUCOSE BLD-MCNC: 88 MG/DL (ref 70–110)
GLUCOSE UR STRIP-MCNC: NEGATIVE MG/DL
HCT VFR BLD AUTO: 30.9 % (ref 37–47)
HGB BLD-MCNC: 9.6 G/DL (ref 12–16)
HGB UR QL STRIP.AUTO: NEGATIVE
HYALINE CASTS UR QL AUTO: ABNORMAL /LPF
IMM GRANULOCYTES # BLD: 0.03 10*3/MM3 (ref 0–0.03)
IMM GRANULOCYTES NFR BLD: 0.4 % (ref 0–0.5)
KETONES UR QL STRIP: NEGATIVE
L PNEUMO1 AG UR QL IA: NEGATIVE
LEUKOCYTE ESTERASE UR QL STRIP.AUTO: ABNORMAL
LYMPHOCYTES # BLD AUTO: 0.93 10*3/MM3 (ref 1–3)
LYMPHOCYTES NFR BLD AUTO: 11.5 % (ref 16–46)
M PNEUMO IGM SER QL: POSITIVE
MCH RBC QN AUTO: 31.1 PG (ref 27–33)
MCHC RBC AUTO-ENTMCNC: 31.1 G/DL (ref 33–37)
MCV RBC AUTO: 100 FL (ref 80–94)
MONOCYTES # BLD AUTO: 0.85 10*3/MM3 (ref 0.1–0.9)
MONOCYTES NFR BLD AUTO: 10.5 % (ref 0–12)
NEUTROPHILS # BLD AUTO: 6.28 10*3/MM3 (ref 1.4–6.5)
NEUTROPHILS NFR BLD AUTO: 77.6 % (ref 40–75)
NITRITE UR QL STRIP: NEGATIVE
OSMOLALITY SERPL CALC.SUM OF ELEC: 292 MOSM/KG (ref 273–305)
PH UR STRIP.AUTO: 6.5 [PH] (ref 5–8)
PLATELET # BLD AUTO: 301 10*3/MM3 (ref 130–400)
PMV BLD AUTO: 9.2 FL (ref 6–10)
POTASSIUM BLD-SCNC: 3.5 MMOL/L (ref 3.5–5.3)
PROT SERPL-MCNC: 7.1 G/DL (ref 6–8)
PROT UR QL STRIP: NEGATIVE
RBC # BLD AUTO: 3.09 10*6/MM3 (ref 4.2–5.4)
RBC # UR: ABNORMAL /HPF
REF LAB TEST METHOD: ABNORMAL
RENAL EPI CELLS #/AREA URNS HPF: ABNORMAL /HPF
SODIUM BLD-SCNC: 143 MMOL/L (ref 135–153)
SP GR UR STRIP: 1.01 (ref 1–1.03)
SQUAMOUS #/AREA URNS HPF: ABNORMAL /HPF
TSH SERPL DL<=0.05 MIU/L-ACNC: 1.48 MIU/ML (ref 0.55–4.78)
UROBILINOGEN UR QL STRIP: ABNORMAL
VIT B12 BLD-MCNC: 867 PG/ML (ref 211–911)
WBC NRBC COR # BLD: 8.09 10*3/MM3 (ref 4.5–12.5)
WBC UR QL AUTO: ABNORMAL /HPF

## 2018-05-09 PROCEDURE — 86738 MYCOPLASMA ANTIBODY: CPT | Performed by: INTERNAL MEDICINE

## 2018-05-09 PROCEDURE — 80053 COMPREHEN METABOLIC PANEL: CPT | Performed by: INTERNAL MEDICINE

## 2018-05-09 PROCEDURE — 81001 URINALYSIS AUTO W/SCOPE: CPT | Performed by: INTERNAL MEDICINE

## 2018-05-09 PROCEDURE — 82746 ASSAY OF FOLIC ACID SERUM: CPT | Performed by: INTERNAL MEDICINE

## 2018-05-09 PROCEDURE — 25010000002 PIPERACILLIN-TAZOBACTAM: Performed by: PHYSICIAN ASSISTANT

## 2018-05-09 PROCEDURE — 25010000002 CEFTRIAXONE: Performed by: INTERNAL MEDICINE

## 2018-05-09 PROCEDURE — 82607 VITAMIN B-12: CPT | Performed by: INTERNAL MEDICINE

## 2018-05-09 PROCEDURE — 87899 AGENT NOS ASSAY W/OPTIC: CPT | Performed by: INTERNAL MEDICINE

## 2018-05-09 PROCEDURE — 84443 ASSAY THYROID STIM HORMONE: CPT | Performed by: INTERNAL MEDICINE

## 2018-05-09 PROCEDURE — 99233 SBSQ HOSP IP/OBS HIGH 50: CPT | Performed by: INTERNAL MEDICINE

## 2018-05-09 PROCEDURE — 85025 COMPLETE CBC W/AUTO DIFF WBC: CPT | Performed by: INTERNAL MEDICINE

## 2018-05-09 PROCEDURE — C1751 CATH, INF, PER/CENT/MIDLINE: HCPCS

## 2018-05-09 PROCEDURE — 86140 C-REACTIVE PROTEIN: CPT | Performed by: INTERNAL MEDICINE

## 2018-05-09 PROCEDURE — 87086 URINE CULTURE/COLONY COUNT: CPT | Performed by: INTERNAL MEDICINE

## 2018-05-09 PROCEDURE — 99222 1ST HOSP IP/OBS MODERATE 55: CPT | Performed by: PHYSICIAN ASSISTANT

## 2018-05-09 RX ORDER — SODIUM CHLORIDE 0.9 % (FLUSH) 0.9 %
10 SYRINGE (ML) INJECTION EVERY 12 HOURS SCHEDULED
Status: DISCONTINUED | OUTPATIENT
Start: 2018-05-09 | End: 2018-05-12 | Stop reason: HOSPADM

## 2018-05-09 RX ORDER — SODIUM CHLORIDE 0.9 % (FLUSH) 0.9 %
10 SYRINGE (ML) INJECTION AS NEEDED
Status: DISCONTINUED | OUTPATIENT
Start: 2018-05-09 | End: 2018-05-12 | Stop reason: HOSPADM

## 2018-05-09 RX ADMIN — CASTOR OIL AND BALSAM, PERU: 788; 87 OINTMENT TOPICAL at 20:03

## 2018-05-09 RX ADMIN — POVIDONE-IODINE: 100 OINTMENT TOPICAL at 08:27

## 2018-05-09 RX ADMIN — SODIUM CHLORIDE 75 ML/HR: 9 INJECTION, SOLUTION INTRAVENOUS at 20:03

## 2018-05-09 RX ADMIN — PIPERACILLIN SODIUM,TAZOBACTAM SODIUM 3.38 G: 3; .375 INJECTION, POWDER, FOR SOLUTION INTRAVENOUS at 03:15

## 2018-05-09 RX ADMIN — DOXYCYCLINE 100 MG: 100 INJECTION, POWDER, LYOPHILIZED, FOR SOLUTION INTRAVENOUS at 15:02

## 2018-05-09 RX ADMIN — DOCUSATE SODIUM 100 MG: 100 CAPSULE, LIQUID FILLED ORAL at 08:27

## 2018-05-09 RX ADMIN — HYDROCODONE BITARTRATE AND ACETAMINOPHEN 1 TABLET: 10; 325 TABLET ORAL at 20:02

## 2018-05-09 RX ADMIN — NITROGLYCERIN 1 PATCH: 0.1 PATCH TRANSDERMAL at 08:27

## 2018-05-09 RX ADMIN — Medication 10 ML: at 20:04

## 2018-05-09 RX ADMIN — LEVOTHYROXINE SODIUM 100 MCG: 100 TABLET ORAL at 08:27

## 2018-05-09 RX ADMIN — PIPERACILLIN SODIUM,TAZOBACTAM SODIUM 3.38 G: 3; .375 INJECTION, POWDER, FOR SOLUTION INTRAVENOUS at 11:39

## 2018-05-09 RX ADMIN — CARVEDILOL 3.12 MG: 3.12 TABLET, FILM COATED ORAL at 17:03

## 2018-05-09 RX ADMIN — DOCUSATE SODIUM 100 MG: 100 CAPSULE, LIQUID FILLED ORAL at 20:03

## 2018-05-09 RX ADMIN — FUROSEMIDE 40 MG: 40 TABLET ORAL at 08:27

## 2018-05-09 RX ADMIN — POVIDONE-IODINE: 100 OINTMENT TOPICAL at 20:03

## 2018-05-09 RX ADMIN — ASPIRIN AND DIPYRIDAMOLE 1 CAPSULE: 25; 200 CAPSULE, EXTENDED RELEASE ORAL at 20:03

## 2018-05-09 RX ADMIN — DOXEPIN HYDROCHLORIDE 50 MG: 25 CAPSULE ORAL at 08:27

## 2018-05-09 RX ADMIN — ASPIRIN AND DIPYRIDAMOLE 1 CAPSULE: 25; 200 CAPSULE, EXTENDED RELEASE ORAL at 08:27

## 2018-05-09 RX ADMIN — CEFTRIAXONE 1 G: 1 INJECTION, POWDER, FOR SOLUTION INTRAMUSCULAR; INTRAVENOUS at 15:02

## 2018-05-09 RX ADMIN — DOXEPIN HYDROCHLORIDE 50 MG: 25 CAPSULE ORAL at 20:02

## 2018-05-09 RX ADMIN — FERROUS SULFATE TAB 325 MG (65 MG ELEMENTAL FE) 325 MG: 325 (65 FE) TAB at 08:27

## 2018-05-09 RX ADMIN — FUROSEMIDE 40 MG: 40 TABLET ORAL at 17:03

## 2018-05-09 RX ADMIN — CARVEDILOL 3.12 MG: 3.12 TABLET, FILM COATED ORAL at 08:27

## 2018-05-09 RX ADMIN — Medication 1 CAPSULE: at 08:27

## 2018-05-09 RX ADMIN — CASTOR OIL AND BALSAM, PERU: 788; 87 OINTMENT TOPICAL at 08:27

## 2018-05-09 NOTE — PROGRESS NOTES
[facility] HOSPITALIST PROGRESS NOTE     Patient Identification:  Name:  Zoe Thakkar  Age:  89 y.o.  Sex:  female  :  4/15/1926  MRN:  [id number]  Visit Number:  [id number]  Primary Care Provider:  Flako Webster MD    Length of stay:  1    Chief complaint:  89 y.o. old female admitted with No chief complaint on file.          Subjective:    No acute issues overnight.  Patient denies any new complaints.  She is nothing by mouth for ERCP and she states that she has not eaten anything since yesterday.  Patient is complaining of mild abdominal pain improved since yesterday.         Current Hospital Meds:    aspirin-dipyridamole 1 capsule Oral BID   carvedilol 3.125 mg Oral BID With Meals   castor oil-balsam peru  Topical Q12H   docusate sodium 100 mg Oral BID   doxepin 50 mg Oral BID   ferrous sulfate 325 mg Oral BID   furosemide 40 mg Oral BID   lactobacillus acidophilus 1 capsule Oral Daily   levothyroxine 100 mcg Oral Daily   nitroglycerin 1 patch Transdermal Daily   piperacillin-tazobactam 3.375 g Intravenous Q8H   povidone-iodine  Topical Q12H       sodium chloride 75 mL/hr Last Rate: 75 mL/hr (18)     ----------------------------------------------------------------------------------------------------------------------  Vital Signs:  Temp:  [97 °F (36.1 °C)-99 °F (37.2 °C)] 97.6 °F (36.4 °C)  Heart Rate:  [70-76] 73  Resp:  [18-20] 18  BP: (116-146)/(52-72) 116/59  1    18  0304   Weight: 65.8 kg (145 lb 1.6 oz)     Body mass index is 24.91 kg/m².    Intake/Output Summary (Last 24 hours) at 18 1053  Last data filed at 18   Gross per 24 hour   Intake              850 ml   Output                0 ml   Net              850 ml     NPO Diet  ----------------------------------------------------------------------------------------------------------------------  Physical exam:  Constitutional:  Well-developed and well-nourished.   Elderly female lying in bed in no  acute distress.  HENT:  Head:  Normocephalic and atraumatic.  Mouth:  Moist mucous membranes.    Eyes:  Conjunctivae and EOM are normal.  Pupils are equal, round, and reactive to light.   Neck:  Neck supple.  No JVD present.    Cardiovascular:  Regular rate and rhythm. S1+S2. No murmur, rubs or gallops.   Pulmonary/Chest: Clear to auscultation bilaterally.   Abdominal:  Soft.  Mild tenderness to palpation in mid abdomen and barium plical area.. No viscera palpable.  Bowel sounds audible.   Musculoskeletal: No deformity or joint swelling.   Peripheral vascular: Bilateral dorsalis pedis palpable. No edema.   Neurological:  Alert and oriented to person, place, and time.  Cranial nerves grossly intact. Strength bilaterally symmetrical in upper and lower extremities.   Skin:  Skin is warm and dry. No rash noted. No pallor.   ----------------------------------------------------------------------------------------------------------------------  Tele:    ----------------------------------------------------------------------------------------------------------------------        Results from last 7 days  Lab Units 05/09/18  0202 05/08/18  0513 05/08/18  0246   CRP mg/dL 10.50* 9.47*  --    LACTATE mmol/L  --   --  0.9   WBC 10*3/mm3 8.09  --  14.08*   HEMOGLOBIN g/dL 9.6*  --  10.4*   HEMATOCRIT % 30.9*  --  32.6*   MCV fL 100.0*  --  100.6*   MCHC g/dL 31.1*  --  31.9*   PLATELETS 10*3/mm3 301  --  328           Results from last 7 days  Lab Units 05/09/18  0202 05/08/18  0245   SODIUM mmol/L 143 136   POTASSIUM mmol/L 3.5 3.6   CHLORIDE mmol/L 107 103   CO2 mmol/L 26.9 24.8   BUN mg/dL 34* 36*   CREATININE mg/dL 1.23 1.13   EGFR IF NONAFRICN AM mL/min/1.73 41* 45*   CALCIUM mg/dL 8.8 8.9   GLUCOSE mg/dL 88 122*   ALBUMIN g/dL 3.40 3.40   BILIRUBIN mg/dL 0.4 0.6   ALK PHOS U/L 217* 267*   AST (SGOT) U/L 33* 60*   ALT (SGPT) U/L 45* 61*   Estimated Creatinine Clearance: 27.2 mL/min (by C-G formula based on SCr of 1.23  mg/dL).    No results found for: AMMONIA      Blood Culture   Date Value Ref Range Status   05/08/2018 No growth at less than 24 hours  Preliminary   05/08/2018 No growth at less than 24 hours  Preliminary                I have personally looked at the labs and they are summarized above.  ----------------------------------------------------------------------------------------------------------------------  Imaging Results (last 24 hours)     Procedure Component Value Units Date/Time    XR Chest AP [[id number]] Collected:  05/09/18 0900     Updated:  05/09/18 0903    Narrative:       XR CHEST AP-     CLINICAL INDICATION: Fever, cough.          COMPARISON: None available.      TECHNIQUE: Single frontal view of the chest.     FINDINGS:     Right infrahilar consolidation.  The cardiac silhouette is normal. The pulmonary vasculature is  unremarkable.  There is no evidence of an acute osseous abnormality.   There are no suspicious-appearing parenchymal soft tissue nodules.            Impression:       Right infrahilar consolidation.              This report was finalized on 5/9/2018 9:01 AM by Dr. Jassi Babcock MD.       US Abdomen Complete [[id number]] Collected:  05/08/18 1058     Updated:  05/08/18 1105    Narrative:       US ABDOMEN COMPLETE-      HISTORY:   Epigastric pain, fever, history of cholangitis          COMPARISON: None available.     FINDINGS: Sonographic imaging of the abdomen shows no evidence of a  pancreatic mass.     The liver is homogeneous in echotexture. There is no intrahepatic ductal  dilatation or focal hepatic mass. Shadowing from the gallbladder fossa  appears to be due to air. The patient has had previous cholecystectomy.     The abdominal aorta does not appear to be dilated. Common bile duct is 1  cm.     Kidneys show no hydronephrosis and the spleen is homogeneous.       Impression:       Common bile duct is prominent.      This report was finalized on 5/8/2018 10:59 AM by Dr. Jassi Babcock  MD.           ----------------------------------------------------------------------------------------------------------------------  Assessment and Plan:    Sepsis  Due to pneumonia and urinary tract infection.  Patient is afebrile and VS stable.  Will start patient on Rocephin and doxycycline and discontinue Zosyn.  WBC is within normal limits and CRP has worsened slightly since yesterday and is up to 10.  No growth on blood cultures so far.  Continue to follow inflammatory markers and culture results.    Community-acquired pneumonia  Chest x-ray showed infrahilar pneumonia.  Will check urine Legionella antigen and mycoplasma antibodies.  Continue Rocephin and doxycycline.  Follow-up on blood cultures.    Acute urinary tract infection  Continue Rocephin as outlined above.  Follow up on urine culture results.    LFT elevation, improving  Likely due to sepsis and pneumonia.  Ultrasound of the liver showed no acute abnormality.  Patient has previous history of cholelithiasis with ERCP and stent placement.  Patient seen by Dr. Townsend and is scheduled to undergo ERCP later today.      Essential hypertension  Controlled.  Continue Coreg and continue to monitor blood pressure.    History of CHF, combined systolic and diastolic  Compensated.  Continue beta blocker and Lasix.  Monitor IS/O and daily weights.    Cerebrovascular disease  Continue Aggrenox and statin.    Prophylaxis  DVT: Heparin  GI: PPI.     The patient is high risk due to: Sepsis, UTI, pneumonia, LFT elevation, CHF    I discussed the patients findings and my recommendations with patient and nursing staff.    Cj Gaines MD  05/09/18  10:53 AM

## 2018-05-09 NOTE — PROGRESS NOTES
Discharge Planning Assessment  [facility]     Patient Name: Zoe Thakkar  MRN: [id number]  Today's Date: 5/9/2018    Admit Date: 5/8/2018      Discharge Plan     Row Name 05/09/18 1618       Plan    Plan Pt was admitted from [facility] and has a bed hold until further notice. SS to follow.           Tammi Mccain

## 2018-05-09 NOTE — PLAN OF CARE
Problem: Patient Care Overview  Goal: Plan of Care Review  Outcome: Ongoing (interventions implemented as appropriate)   05/08/18 0319 05/09/18 0800   Coping/Psychosocial   Plan of Care Reviewed With --  patient;friend   Plan of Care Review   Progress no change --      Goal: Discharge Needs Assessment  Outcome: Ongoing (interventions implemented as appropriate)   05/08/18 0200 05/08/18 1143 05/08/18 1147   Discharge Needs Assessment   Readmission Within the Last 30 Days --  --  no previous admission in last 30 days   Patient/Family Anticipates Transition to long term care facility --  --    Patient/Family Anticipated Services at Transition none --  --    Transportation Concerns other (see comments)  (ambulance) --  --    Transportation Anticipated health plan transportation --  --    Discharge Facility/Level of Care Needs --  (SS contacted [facility] [phone] per Travon who states pt has a bed hold until further notice. Travon at [facility] states pt's insurance does not require a pre-cert. at this time, therefore PT/OT consults are not needed. ) --    Disability   Equipment Currently Used at Home hospital bed;power chair,(recliner lift) --  --        Problem: Fall Risk (Adult)  Goal: Identify Related Risk Factors and Signs and Symptoms  Outcome: Ongoing (interventions implemented as appropriate)   05/08/18 0319   Fall Risk (Adult)   Related Risk Factors (Fall Risk) age-related changes;fatigue/slow reaction;gait/mobility problems;history of falls;impaired vision   Signs and Symptoms (Fall Risk) presence of risk factors     Goal: Absence of Fall  Outcome: Ongoing (interventions implemented as appropriate)   05/09/18 1304   Fall Risk (Adult)   Absence of Fall making progress toward outcome       Problem: Skin Injury Risk (Adult)  Goal: Identify Related Risk Factors and Signs and Symptoms  Outcome: Ongoing (interventions implemented as appropriate)   05/08/18 0319   Skin Injury Risk (Adult)    Related Risk Factors (Skin Injury Risk) advanced age;infection;tissue perfusion altered     Goal: Skin Health and Integrity  Outcome: Ongoing (interventions implemented as appropriate)   05/09/18 1304   Skin Injury Risk (Adult)   Skin Health and Integrity making progress toward outcome       Problem: Infection, Risk/Actual (Adult)  Goal: Identify Related Risk Factors and Signs and Symptoms  Outcome: Ongoing (interventions implemented as appropriate)   05/08/18 0319   Infection, Risk/Actual (Adult)   Related Risk Factors (Infection, Risk/Actual) age extremes;chronic illness/condition;tissue perfusion altered;skin integrity impairment   Signs and Symptoms (Infection, Risk/Actual) body temperature changes;chills;lab value changes;nausea     Goal: Infection Prevention/Resolution  Outcome: Ongoing (interventions implemented as appropriate)   05/09/18 1304   Infection, Risk/Actual (Adult)   Infection Prevention/Resolution making progress toward outcome

## 2018-05-09 NOTE — PLAN OF CARE
Problem: Patient Care Overview  Goal: Individualization and Mutuality  Outcome: Ongoing (interventions implemented as appropriate)    Goal: Interprofessional Rounds/Family Conf  Outcome: Ongoing (interventions implemented as appropriate)

## 2018-05-10 ENCOUNTER — ANESTHESIA (OUTPATIENT)
Dept: PERIOP | Facility: HOSPITAL | Age: 83
End: 2018-05-10

## 2018-05-10 ENCOUNTER — APPOINTMENT (OUTPATIENT)
Dept: GENERAL RADIOLOGY | Facility: HOSPITAL | Age: 83
End: 2018-05-10

## 2018-05-10 LAB
ALBUMIN SERPL-MCNC: 3.3 G/DL (ref 3.4–4.8)
ALBUMIN/GLOB SERPL: 0.9 G/DL (ref 1.5–2.5)
ALP SERPL-CCNC: 198 U/L (ref 35–104)
ALT SERPL W P-5'-P-CCNC: 32 U/L (ref 10–36)
ANION GAP SERPL CALCULATED.3IONS-SCNC: 11.3 MMOL/L (ref 3.6–11.2)
AST SERPL-CCNC: 21 U/L (ref 10–30)
BASOPHILS # BLD AUTO: 0 10*3/MM3 (ref 0–0.3)
BASOPHILS NFR BLD AUTO: 0 % (ref 0–2)
BILIRUB SERPL-MCNC: 0.3 MG/DL (ref 0.2–1.8)
BUN BLD-MCNC: 28 MG/DL (ref 7–21)
BUN/CREAT SERPL: 23.1 (ref 7–25)
CALCIUM SPEC-SCNC: 8.2 MG/DL (ref 7.7–10)
CHLORIDE SERPL-SCNC: 104 MMOL/L (ref 99–112)
CO2 SERPL-SCNC: 24.7 MMOL/L (ref 24.3–31.9)
CREAT BLD-MCNC: 1.21 MG/DL (ref 0.43–1.29)
CRP SERPL-MCNC: 5.26 MG/DL (ref 0–0.99)
DEPRECATED RDW RBC AUTO: 49 FL (ref 37–54)
EOSINOPHIL # BLD AUTO: 0 10*3/MM3 (ref 0–0.7)
EOSINOPHIL NFR BLD AUTO: 0 % (ref 0–7)
ERYTHROCYTE [DISTWIDTH] IN BLOOD BY AUTOMATED COUNT: 13.9 % (ref 11.5–14.5)
GFR SERPL CREATININE-BSD FRML MDRD: 42 ML/MIN/1.73
GLOBULIN UR ELPH-MCNC: 3.5 GM/DL
GLUCOSE BLD-MCNC: 75 MG/DL (ref 70–110)
HCT VFR BLD AUTO: 30.1 % (ref 37–47)
HGB BLD-MCNC: 9.3 G/DL (ref 12–16)
IMM GRANULOCYTES # BLD: 0.03 10*3/MM3 (ref 0–0.03)
IMM GRANULOCYTES NFR BLD: 0.4 % (ref 0–0.5)
LYMPHOCYTES # BLD AUTO: 1.08 10*3/MM3 (ref 1–3)
LYMPHOCYTES NFR BLD AUTO: 13.7 % (ref 16–46)
MCH RBC QN AUTO: 31 PG (ref 27–33)
MCHC RBC AUTO-ENTMCNC: 30.9 G/DL (ref 33–37)
MCV RBC AUTO: 100.3 FL (ref 80–94)
MONOCYTES # BLD AUTO: 0.88 10*3/MM3 (ref 0.1–0.9)
MONOCYTES NFR BLD AUTO: 11.2 % (ref 0–12)
NEUTROPHILS # BLD AUTO: 5.9 10*3/MM3 (ref 1.4–6.5)
NEUTROPHILS NFR BLD AUTO: 74.7 % (ref 40–75)
OSMOLALITY SERPL CALC.SUM OF ELEC: 283.6 MOSM/KG (ref 273–305)
PLATELET # BLD AUTO: 334 10*3/MM3 (ref 130–400)
PMV BLD AUTO: 9.5 FL (ref 6–10)
POTASSIUM BLD-SCNC: 3.2 MMOL/L (ref 3.5–5.3)
PROT SERPL-MCNC: 6.8 G/DL (ref 6–8)
RBC # BLD AUTO: 3 10*6/MM3 (ref 4.2–5.4)
SODIUM BLD-SCNC: 140 MMOL/L (ref 135–153)
WBC NRBC COR # BLD: 7.89 10*3/MM3 (ref 4.5–12.5)

## 2018-05-10 PROCEDURE — 0F798DZ DILATION OF COMMON BILE DUCT WITH INTRALUMINAL DEVICE, VIA NATURAL OR ARTIFICIAL OPENING ENDOSCOPIC: ICD-10-PCS | Performed by: INTERNAL MEDICINE

## 2018-05-10 PROCEDURE — C1769 GUIDE WIRE: HCPCS | Performed by: INTERNAL MEDICINE

## 2018-05-10 PROCEDURE — 25010000002 CEFTRIAXONE: Performed by: INTERNAL MEDICINE

## 2018-05-10 PROCEDURE — 99232 SBSQ HOSP IP/OBS MODERATE 35: CPT | Performed by: INTERNAL MEDICINE

## 2018-05-10 PROCEDURE — 0FC98ZZ EXTIRPATION OF MATTER FROM COMMON BILE DUCT, VIA NATURAL OR ARTIFICIAL OPENING ENDOSCOPIC: ICD-10-PCS | Performed by: INTERNAL MEDICINE

## 2018-05-10 PROCEDURE — 94799 UNLISTED PULMONARY SVC/PX: CPT

## 2018-05-10 PROCEDURE — 80053 COMPREHEN METABOLIC PANEL: CPT | Performed by: INTERNAL MEDICINE

## 2018-05-10 PROCEDURE — 43264 ERCP REMOVE DUCT CALCULI: CPT | Performed by: INTERNAL MEDICINE

## 2018-05-10 PROCEDURE — 85025 COMPLETE CBC W/AUTO DIFF WBC: CPT | Performed by: INTERNAL MEDICINE

## 2018-05-10 PROCEDURE — 74328 X-RAY BILE DUCT ENDOSCOPY: CPT | Performed by: RADIOLOGY

## 2018-05-10 PROCEDURE — C2625 STENT, NON-COR, TEM W/DEL SY: HCPCS | Performed by: INTERNAL MEDICINE

## 2018-05-10 PROCEDURE — 43262 ENDO CHOLANGIOPANCREATOGRAPH: CPT | Performed by: INTERNAL MEDICINE

## 2018-05-10 PROCEDURE — 25010000002 ONDANSETRON PER 1 MG: Performed by: NURSE ANESTHETIST, CERTIFIED REGISTERED

## 2018-05-10 PROCEDURE — 76000 FLUOROSCOPY <1 HR PHYS/QHP: CPT

## 2018-05-10 PROCEDURE — 43274 ERCP DUCT STENT PLACEMENT: CPT | Performed by: INTERNAL MEDICINE

## 2018-05-10 PROCEDURE — 74328 X-RAY BILE DUCT ENDOSCOPY: CPT | Performed by: INTERNAL MEDICINE

## 2018-05-10 PROCEDURE — 25010000002 NEOSTIGMINE 10 MG/10ML SOLUTION: Performed by: NURSE ANESTHETIST, CERTIFIED REGISTERED

## 2018-05-10 PROCEDURE — 25010000002 DEXAMETHASONE PER 1 MG: Performed by: NURSE ANESTHETIST, CERTIFIED REGISTERED

## 2018-05-10 PROCEDURE — 86140 C-REACTIVE PROTEIN: CPT | Performed by: INTERNAL MEDICINE

## 2018-05-10 PROCEDURE — 25010000002 PROPOFOL 10 MG/ML EMULSION: Performed by: NURSE ANESTHETIST, CERTIFIED REGISTERED

## 2018-05-10 DEVICE — BILIARY STENT WITH NAVIFLEXTM RX DELIVERY SYSTEM
Type: IMPLANTABLE DEVICE | Status: FUNCTIONAL
Brand: ADVANIX™ BILIARY

## 2018-05-10 RX ORDER — PROPOFOL 10 MG/ML
VIAL (ML) INTRAVENOUS AS NEEDED
Status: DISCONTINUED | OUTPATIENT
Start: 2018-05-10 | End: 2018-05-10 | Stop reason: SURG

## 2018-05-10 RX ORDER — GLYCOPYRROLATE 0.2 MG/ML
INJECTION INTRAMUSCULAR; INTRAVENOUS AS NEEDED
Status: DISCONTINUED | OUTPATIENT
Start: 2018-05-10 | End: 2018-05-10 | Stop reason: SURG

## 2018-05-10 RX ORDER — OXYCODONE HYDROCHLORIDE AND ACETAMINOPHEN 5; 325 MG/1; MG/1
1 TABLET ORAL ONCE AS NEEDED
Status: DISCONTINUED | OUTPATIENT
Start: 2018-05-10 | End: 2018-05-10 | Stop reason: HOSPADM

## 2018-05-10 RX ORDER — LIDOCAINE HYDROCHLORIDE 20 MG/ML
INJECTION, SOLUTION INFILTRATION; PERINEURAL AS NEEDED
Status: DISCONTINUED | OUTPATIENT
Start: 2018-05-10 | End: 2018-05-10 | Stop reason: SURG

## 2018-05-10 RX ORDER — DEXAMETHASONE SODIUM PHOSPHATE 10 MG/ML
INJECTION INTRAMUSCULAR; INTRAVENOUS AS NEEDED
Status: DISCONTINUED | OUTPATIENT
Start: 2018-05-10 | End: 2018-05-10 | Stop reason: SURG

## 2018-05-10 RX ORDER — NEOSTIGMINE METHYLSULFATE 1 MG/ML
INJECTION, SOLUTION INTRAVENOUS AS NEEDED
Status: DISCONTINUED | OUTPATIENT
Start: 2018-05-10 | End: 2018-05-10 | Stop reason: SURG

## 2018-05-10 RX ORDER — FENTANYL CITRATE 50 UG/ML
50 INJECTION, SOLUTION INTRAMUSCULAR; INTRAVENOUS
Status: DISCONTINUED | OUTPATIENT
Start: 2018-05-10 | End: 2018-05-10 | Stop reason: HOSPADM

## 2018-05-10 RX ORDER — MEPERIDINE HYDROCHLORIDE 50 MG/ML
12.5 INJECTION INTRAMUSCULAR; INTRAVENOUS; SUBCUTANEOUS
Status: DISCONTINUED | OUTPATIENT
Start: 2018-05-10 | End: 2018-05-10 | Stop reason: HOSPADM

## 2018-05-10 RX ORDER — SODIUM CHLORIDE 0.9 % (FLUSH) 0.9 %
1-10 SYRINGE (ML) INJECTION AS NEEDED
Status: DISCONTINUED | OUTPATIENT
Start: 2018-05-10 | End: 2018-05-10 | Stop reason: HOSPADM

## 2018-05-10 RX ORDER — ONDANSETRON 2 MG/ML
4 INJECTION INTRAMUSCULAR; INTRAVENOUS ONCE AS NEEDED
Status: DISCONTINUED | OUTPATIENT
Start: 2018-05-10 | End: 2018-05-10 | Stop reason: HOSPADM

## 2018-05-10 RX ORDER — SODIUM CHLORIDE, SODIUM LACTATE, POTASSIUM CHLORIDE, CALCIUM CHLORIDE 600; 310; 30; 20 MG/100ML; MG/100ML; MG/100ML; MG/100ML
125 INJECTION, SOLUTION INTRAVENOUS CONTINUOUS
Status: DISCONTINUED | OUTPATIENT
Start: 2018-05-10 | End: 2018-05-12

## 2018-05-10 RX ORDER — ONDANSETRON 2 MG/ML
INJECTION INTRAMUSCULAR; INTRAVENOUS AS NEEDED
Status: DISCONTINUED | OUTPATIENT
Start: 2018-05-10 | End: 2018-05-10 | Stop reason: SURG

## 2018-05-10 RX ORDER — IPRATROPIUM BROMIDE AND ALBUTEROL SULFATE 2.5; .5 MG/3ML; MG/3ML
3 SOLUTION RESPIRATORY (INHALATION) ONCE AS NEEDED
Status: DISCONTINUED | OUTPATIENT
Start: 2018-05-10 | End: 2018-05-10 | Stop reason: HOSPADM

## 2018-05-10 RX ORDER — ROCURONIUM BROMIDE 10 MG/ML
INJECTION, SOLUTION INTRAVENOUS AS NEEDED
Status: DISCONTINUED | OUTPATIENT
Start: 2018-05-10 | End: 2018-05-10 | Stop reason: SURG

## 2018-05-10 RX ADMIN — CEFTRIAXONE 1 G: 1 INJECTION, POWDER, FOR SOLUTION INTRAMUSCULAR; INTRAVENOUS at 17:21

## 2018-05-10 RX ADMIN — SODIUM CHLORIDE, POTASSIUM CHLORIDE, SODIUM LACTATE AND CALCIUM CHLORIDE: 600; 310; 30; 20 INJECTION, SOLUTION INTRAVENOUS at 15:07

## 2018-05-10 RX ADMIN — CASTOR OIL AND BALSAM, PERU: 788; 87 OINTMENT TOPICAL at 09:26

## 2018-05-10 RX ADMIN — CASTOR OIL AND BALSAM, PERU: 788; 87 OINTMENT TOPICAL at 20:17

## 2018-05-10 RX ADMIN — Medication 10 ML: at 20:17

## 2018-05-10 RX ADMIN — CARVEDILOL 3.12 MG: 3.12 TABLET, FILM COATED ORAL at 09:26

## 2018-05-10 RX ADMIN — SODIUM CHLORIDE 75 ML/HR: 9 INJECTION, SOLUTION INTRAVENOUS at 20:17

## 2018-05-10 RX ADMIN — DOCUSATE SODIUM 100 MG: 100 CAPSULE, LIQUID FILLED ORAL at 09:26

## 2018-05-10 RX ADMIN — DOXYCYCLINE 100 MG: 100 INJECTION, POWDER, LYOPHILIZED, FOR SOLUTION INTRAVENOUS at 02:15

## 2018-05-10 RX ADMIN — LIDOCAINE HYDROCHLORIDE 60 MG: 20 INJECTION, SOLUTION INFILTRATION; PERINEURAL at 15:12

## 2018-05-10 RX ADMIN — ASPIRIN AND DIPYRIDAMOLE 1 CAPSULE: 25; 200 CAPSULE, EXTENDED RELEASE ORAL at 20:16

## 2018-05-10 RX ADMIN — POVIDONE-IODINE: 100 OINTMENT TOPICAL at 09:26

## 2018-05-10 RX ADMIN — POVIDONE-IODINE: 100 OINTMENT TOPICAL at 20:17

## 2018-05-10 RX ADMIN — ROCURONIUM BROMIDE 20 MG: 10 INJECTION INTRAVENOUS at 15:12

## 2018-05-10 RX ADMIN — ONDANSETRON 4 MG: 2 INJECTION, SOLUTION INTRAMUSCULAR; INTRAVENOUS at 15:12

## 2018-05-10 RX ADMIN — FUROSEMIDE 40 MG: 40 TABLET ORAL at 17:57

## 2018-05-10 RX ADMIN — DOXYCYCLINE 100 MG: 100 INJECTION, POWDER, LYOPHILIZED, FOR SOLUTION INTRAVENOUS at 17:21

## 2018-05-10 RX ADMIN — GLYCOPYRROLATE 0.4 MG: 0.2 INJECTION, SOLUTION INTRAMUSCULAR; INTRAVENOUS at 16:21

## 2018-05-10 RX ADMIN — DOCUSATE SODIUM 100 MG: 100 CAPSULE, LIQUID FILLED ORAL at 20:17

## 2018-05-10 RX ADMIN — PROPOFOL 120 MG: 10 INJECTION, EMULSION INTRAVENOUS at 15:12

## 2018-05-10 RX ADMIN — DEXAMETHASONE SODIUM PHOSPHATE 4 MG: 10 INJECTION INTRAMUSCULAR; INTRAVENOUS at 15:12

## 2018-05-10 RX ADMIN — Medication 10 ML: at 09:26

## 2018-05-10 RX ADMIN — LEVOTHYROXINE SODIUM 100 MCG: 100 TABLET ORAL at 09:26

## 2018-05-10 RX ADMIN — Medication 1 CAPSULE: at 09:26

## 2018-05-10 RX ADMIN — FUROSEMIDE 40 MG: 40 TABLET ORAL at 09:26

## 2018-05-10 RX ADMIN — DOXEPIN HYDROCHLORIDE 50 MG: 25 CAPSULE ORAL at 20:16

## 2018-05-10 RX ADMIN — NEOSTIGMINE METHYLSULFATE 3 MG: 1 INJECTION, SOLUTION INTRAVENOUS at 16:21

## 2018-05-10 RX ADMIN — CARVEDILOL 3.12 MG: 3.12 TABLET, FILM COATED ORAL at 17:21

## 2018-05-10 RX ADMIN — DOXEPIN HYDROCHLORIDE 50 MG: 25 CAPSULE ORAL at 09:26

## 2018-05-10 NOTE — PROGRESS NOTES
[facility] HOSPITALIST PROGRESS NOTE     Patient Identification:  Name:  Zoe Thakkar  Age:  89 y.o.  Sex:  female  :  4/15/1926  MRN:  [id number]  Visit Number:  [id number]  Primary Care Provider:  Flako Webster MD    Length of stay:  2    Chief complaint:  89 y.o. old female admitted with sepsis due to pneumonia and UTI        Subjective:    Patient underwent ERCP today.  She states that she's feeling better.  Her breathing is improved.  Patient denies any new complaints.           Current Hospital Meds:    aspirin-dipyridamole 1 capsule Oral BID   carvedilol 3.125 mg Oral BID With Meals   castor oil-balsam peru  Topical Q12H   ceftriaxone 1 g Intravenous Q24H   docusate sodium 100 mg Oral BID   doxepin 50 mg Oral BID   doxycycline 100 mg Intravenous Q12H   furosemide 40 mg Oral BID   lactobacillus acidophilus 1 capsule Oral Daily   levothyroxine 100 mcg Oral Daily   nitroglycerin 1 patch Transdermal Daily   povidone-iodine  Topical Q12H   sodium chloride 10 mL Intracatheter Q12H       sodium chloride 75 mL/hr Last Rate: 75 mL/hr (18)     ----------------------------------------------------------------------------------------------------------------------  Vital Signs:  Temp:  [97.9 °F (36.6 °C)-98.9 °F (37.2 °C)] 97.9 °F (36.6 °C)  Heart Rate:  [65-81] 65  Resp:  [18-20] 18  BP: (130-176)/(52-78) 151/53  1    18  0304   Weight: 65.8 kg (145 lb 1.6 oz)     Body mass index is 24.91 kg/m².    Intake/Output Summary (Last 24 hours) at 05/10/18 1051  Last data filed at 05/10/18 0300   Gross per 24 hour   Intake             1210 ml   Output                0 ml   Net             1210 ml     NPO Diet  ----------------------------------------------------------------------------------------------------------------------  Physical exam:  Constitutional:  Well-developed and well-nourished.   Elderly female lying in bed in no acute distress.  HENT:  Head:  Normocephalic and atraumatic.   Mouth:  Moist mucous membranes.    Eyes:  Conjunctivae and EOM are normal.  Pupils are equal, round, and reactive to light.   Neck:  Neck supple.  No JVD present.    Cardiovascular:  Regular rate and rhythm. S1+S2. Systolic murmur  Pulmonary/Chest: Clear to auscultation bilaterally.   Abdominal:  Soft.  Mild tenderness to palpation in mid abdomen and barium plical area.. No viscera palpable.  Bowel sounds audible.   Musculoskeletal: No deformity or joint swelling.   Peripheral vascular: Bilateral dorsalis pedis palpable. No edema.   Neurological:  Alert and oriented to person, place, and time.  Cranial nerves grossly intact. Strength bilaterally symmetrical in upper and lower extremities.   Skin:  Skin is warm and dry. No rash noted. No pallor.   ----------------------------------------------------------------------------------------------------------------------  Tele:  Sinus rhythm with first-degree AV block  ----------------------------------------------------------------------------------------------------------------------        Results from last 7 days  Lab Units 05/10/18  0026 05/09/18  0202 05/08/18  0513 05/08/18  0246   CRP mg/dL  --  10.50* 9.47*  --    LACTATE mmol/L  --   --   --  0.9   WBC 10*3/mm3 7.89 8.09  --  14.08*   HEMOGLOBIN g/dL 9.3* 9.6*  --  10.4*   HEMATOCRIT % 30.1* 30.9*  --  32.6*   MCV fL 100.3* 100.0*  --  100.6*   MCHC g/dL 30.9* 31.1*  --  31.9*   PLATELETS 10*3/mm3 334 301  --  328           Results from last 7 days  Lab Units 05/10/18  0026 05/09/18  0202 05/08/18  0245   SODIUM mmol/L 140 143 136   POTASSIUM mmol/L 3.2* 3.5 3.6   CHLORIDE mmol/L 104 107 103   CO2 mmol/L 24.7 26.9 24.8   BUN mg/dL 28* 34* 36*   CREATININE mg/dL 1.21 1.23 1.13   EGFR IF NONAFRICN AM mL/min/1.73 42* 41* 45*   CALCIUM mg/dL 8.2 8.8 8.9   GLUCOSE mg/dL 75 88 122*   ALBUMIN g/dL 3.30* 3.40 3.40   BILIRUBIN mg/dL 0.3 0.4 0.6   ALK PHOS U/L 198* 217* 267*   AST (SGOT) U/L 21 33* 60*   ALT (SGPT) U/L 32  45* 61*   Estimated Creatinine Clearance: 27.7 mL/min (by C-G formula based on SCr of 1.21 mg/dL).    No results found for: AMMONIA      Blood Culture   Date Value Ref Range Status   05/08/2018 No growth at less than 24 hours  Preliminary   05/08/2018 No growth at less than 24 hours  Preliminary                I have personally looked at the labs and they are summarized above.  ----------------------------------------------------------------------------------------------------------------------  Imaging Results (last 24 hours)     ** No results found for the last 24 hours. **        ----------------------------------------------------------------------------------------------------------------------  Assessment and Plan:    Sepsis  Due to pneumonia and urinary tract infection.  Patient is afebrile and VS stable. Continue Rocephin and doxycycline.  WBC is within normal limits and CRP Is improving.  No growth on blood cultures so far.  Continue to follow inflammatory markers and culture results.    Community-acquired pneumonia  Chest x-ray showed infrahilar pneumonia.  Urine Legionella antigen is negative and serology positive for mycoplasma IgM.  Continue Rocephin and doxycycline.  Place patient in isolation droplet precautions.  Follow-up on blood cultures.    Acute urinary tract infection  Continue Rocephin as outlined above.  Follow up on urine culture results.    LFT elevation, Resolved.  Ultrasound of the liver showed no acute abnormality.      Cholelithiasis   Patient has previous history of cholelithiasis with ERCP and stent placement.  Status post repeat ERCP and stent placement.      Essential hypertension  Controlled.  Continue Coreg and continue to monitor blood pressure.    History of CHF, combined systolic and diastolic  Compensated.  Continue beta blocker and Lasix.  Monitor I/O and daily weights.    Cerebrovascular disease  Continue Aggrenox and statin.    Hypokalemia  Replace potassium per  protocol.    Prophylaxis  DVT: Heparin  GI: PPI.     The patient is high risk due to: Sepsis, UTI, pneumonia, LFT elevation, CHF    I discussed the patients findings and my recommendations with patient and nursing staff.    Cj Gaines MD  05/10/18  10:51 AM

## 2018-05-10 NOTE — ANESTHESIA PREPROCEDURE EVALUATION
Anesthesia Evaluation     Patient summary reviewed and Nursing notes reviewed   no history of anesthetic complications:  NPO Solid Status: > 8 hours  NPO Liquid Status: > 8 hours           Airway   Mallampati: II  TM distance: >3 FB  Neck ROM: full  no difficulty expected  Dental - normal exam     Pulmonary - negative pulmonary ROS and normal exam   (-) not a smoker  Cardiovascular - normal exam  Exercise tolerance: good (4-7 METS)    NYHA Classification: II  Patient on routine beta blocker and Beta blocker given within 24 hours of surgery    (+) hypertension, past MI (2013)  >12 months, CAD, dysrhythmias, CHF, hyperlipidemia,       Neuro/Psych  (+) TIA,     (-) seizures  GI/Hepatic/Renal/Endo    (+)  GERD,  liver disease, hypothyroidism,     Musculoskeletal     (+) gait problem,   Abdominal  - normal exam    Bowel sounds: normal.   Substance History - negative use     OB/GYN negative ob/gyn ROS         Other      history of cancer                    Anesthesia Plan    ASA 3     general     intravenous induction   Anesthetic plan and risks discussed with patient and other.  Use of blood products discussed with patient and other  Consented to blood products.

## 2018-05-10 NOTE — CONSULTS
05/09/18  No chief complaint on file.    Zoe Thakkar is a 89 y.o. female who presents today at the request of Aviva Gonzalez DO for fever and epigastric pain with history of cholangitis and stent placement    HPI  The patient was seen for a GI evaluation of fever and epigastric pain.  One year ago the patient had an ERCP by Dr. Kurtz.  Patient was diagnosed with ascending cholangitis and stent was placed.  She also has occasional nausea and constipation.  Imaging revealed a prominent common bile duct.  ALT is 45, AST is 33 and alkaline phosphatase is 217.  Total bilirubin is normal.  Medical, surgical, social, and family histories were reviewed and are listed below.        Review of Systems  History obtained from the patient  General ROS: positive for-fever; negative for - chills, fatigue,  malaise, weight gain or weight loss  Psychological ROS: negative for - anxiety, behavioral disorder, depression, disorientation, irritability, memory difficulties or suicidal ideation  ENT ROS: negative for - epistaxis, headaches, hearing change, nasal congestion, nasal discharge, oral lesions, sinus pain, sneezing, sore throat, tinnitus, vertigo, visual changes or vocal changes  Hematological and Lymphatic ROS: negative for - bleeding problems, blood clots, blood transfusions, bruising, fatigue, jaundice, night sweats, pallor, swollen lymph nodes or weight loss  Endocrine ROS: negative for - breast changes, hot flashes, malaise/lethargy, palpitations, polydipsia/polyuria, temperature intolerance or unexpected weight changes  Respiratory ROS: negative for - cough, hemoptysis, orthopnea, pleuritic pain, shortness of breath, tachypnea or wheezing  Cardiovascular ROS: negative for - chest pain, dyspnea on exertion, edema, irregular heartbeat, loss of consciousness, murmur, orthopnea, palpitations, paroxysmal nocturnal dyspnea, rapid heart rate or shortness of breath  Gastrointestinal ROS: positive for-nausea, epigastric pain,  constipation; negative for -  appetite loss, blood in stools, change in bowel habits, change in stools,  diarrhea, gas/bloating, heartburn, hematemesis, melena, vomiting, stool incontinence or swallowing difficulty/pain  Genito-Urinary ROS: negative for - change in urinary stream, incontinence, nocturia, pelvic pain, scrotal mass/pain, urinary frequency/urgency or vulvar/vaginal symptoms  Musculoskeletal ROS: negative for - gait disturbance, joint pain, joint stiffness, joint swelling, muscle pain or muscular weakness  Neurological ROS: negative for - behavioral changes, bowel and bladder control changes, confusion, dizziness, gait disturbance, headaches, impaired coordination/balance, memory loss, numbness/tingling, seizures, speech problems, tremors, visual changes or weakness  Dermatological ROS: negative for lumps, nail changes, pruritus, rash and skin lesion changes    ACTIVE PROBLEMS:   Specialty Problems        Gastroenterology Problems    Common bile duct dilatation              PAST MEDICAL HISTORY:  Past Medical History:   Diagnosis Date   • Acute myocardial infarction    • Anemia    • Ascending cholangitis    • Auricular fibrillation    • Calculus bile duct w/obstruction and w/o cholangitis or cholecystitis    • Cancer     History of breast cancer   • CHF (congestive heart failure)    • Cirrhosis of liver    • Coronary artery disease    • Heart failure    • Hypertension    • Hypothyroidism    • TIA (transient ischemic attack)    • Vitamin D deficiency        SURGICAL HISTORY:  Past Surgical History:   Procedure Laterality Date   • BREAST SURGERY     • CARDIAC DEFIBRILLATOR PLACEMENT     • CAROTID ENDARTERECTOMY     • CHOLECYSTECTOMY     • ERCP W/ METAL STENT PLACEMENT      Unsure if metal or plastic   • PACEMAKER IMPLANTATION         FAMILY HISTORY:  History reviewed. No pertinent family history.    SOCIAL HISTORY:  Social History   Substance Use Topics   • Smoking status: Never Smoker   • Smokeless  tobacco: Never Used   • Alcohol use Not on file       CURRENT MEDICATION:    Current Facility-Administered Medications:   •  acetaminophen (TYLENOL) suppository 650 mg, 650 mg, Rectal, Q4H PRN, Aviva Gonzalez DO  •  acetaminophen (TYLENOL) tablet 650 mg, 650 mg, Oral, Q6H PRN, Aviva Gonzalez DO  •  aspirin-dipyridamole (AGGRENOX)  MG per 12 hr capsule 1 capsule, 1 capsule, Oral, BID, Aviva Gonzalez DO, 1 capsule at 05/09/18 2003  •  carvedilol (COREG) tablet 3.125 mg, 3.125 mg, Oral, BID With Meals, Aviva Gonzalez DO, 3.125 mg at 05/09/18 1703  •  castor oil-balsam peru (VENELEX) ointment, , Topical, Q12H, Cj Gaines MD  •  cefTRIAXone (ROCEPHIN) 1 g/100 mL 0.9% NS (MBP), 1 g, Intravenous, Q24H, Cj Gaines MD, 1 g at 05/09/18 1502  •  docusate sodium (COLACE) capsule 100 mg, 100 mg, Oral, BID, Aviva Gonzalez DO, 100 mg at 05/09/18 2003  •  doxepin (SINEquan) capsule 50 mg, 50 mg, Oral, BID, Aviva Gonzalez DO, 50 mg at 05/09/18 2002  •  doxycycline (VIBRAMYCIN) 100 mg/100 mL 0.9% NS MBP, 100 mg, Intravenous, Q12H, Cj Gaines MD, 100 mg at 05/10/18 0215  •  furosemide (LASIX) tablet 40 mg, 40 mg, Oral, BID, Aviva Gonzalez DO, 40 mg at 05/09/18 1703  •  HYDROcodone-acetaminophen (NORCO)  MG per tablet 1 tablet, 1 tablet, Oral, BID PRN, Aviva Gonzalez DO, 1 tablet at 05/09/18 2002  •  lactobacillus acidophilus (RISAQUAD) capsule 1 capsule, 1 capsule, Oral, Daily, FLOR Travis, 1 capsule at 05/09/18 0827  •  levothyroxine (SYNTHROID, LEVOTHROID) tablet 100 mcg, 100 mcg, Oral, Daily, Aviva Gonzalez DO, 100 mcg at 05/09/18 0827  •  nitroglycerin (NITRODUR) 0.1 MG/HR patch 1 patch, 1 patch, Transdermal, Daily, Aviva Gonzalez DO, 1 patch at 05/09/18 0827  •  nitroglycerin (NITROSTAT) SL tablet 0.4 mg, 0.4 mg, Sublingual, Q5 Min PRN, Aviva Gonzalez DO  •  povidone-iodine (BETADINE) ointment, , Topical, Q12H, Cj  Wilmer Gaines MD  •  sodium chloride 0.9 % flush 1-10 mL, 1-10 mL, Intravenous, PRN, Aviva Gonzalez DO  •  sodium chloride 0.9 % flush 10 mL, 10 mL, Intracatheter, Q12H, Cj Gaines MD, 10 mL at 05/09/18 2004  •  sodium chloride 0.9 % flush 10 mL, 10 mL, Intracatheter, PRN, Cj Gaines MD  •  sodium chloride 0.9 % infusion, 75 mL/hr, Intravenous, Continuous, Aviva Gonzalez DO, Last Rate: 75 mL/hr at 05/09/18 2003, 75 mL/hr at 05/09/18 2003    ALLERGIES:  Tuberculin tests     VITAL SIGNS:  Vital Signs (last 24 hours)       05/08 0700  -  05/09 0659 05/09 0700  -  05/10 0649   Most Recent    Temp (°F) 97 -  99    98.1 -  98.9     98.4 (36.9)    Heart Rate 70 -  76    69 -  81     71    Resp 18 -  20    18 -  20     18    /59 -  146/55    130/52 -  176/72     131/63    SpO2 (%)   91      95     95          PHYSICAL EXAMINATION    General Appearance:    Alert, cooperative, in no acute distress   Head:    Normocephalic, without obvious abnormality, atraumatic   Eyes:            Lids and lashes normal, conjunctivae and sclerae normal, no   icterus, no pallor, corneas clear, PERRLA   Ears:    Ears appear intact with no abnormalities noted   Throat:   No oral lesions, no thrush, oral mucosa moist   Neck:   No adenopathy, supple, trachea midline, no thyromegaly, no   carotid bruit, no JVD   Back:     No kyphosis present, no scoliosis present, no skin lesions,      erythema or scars, no tenderness to percussion or                   palpation,   range of motion normal   Lungs:     Clear to auscultation,respirations regular, even and                  unlabored    Heart:    Regular rhythm and normal rate, normal S1 and S2, no            murmur, no gallop, no rub, no click   Chest Wall:    No abnormalities observed   Abdomen:     Epigastric and RUQ tenderness; Normal bowel sounds, no masses, no organomegaly, soft, non-distended, no guarding, no rebound tenderness   Rectal:     Deferred    Extremities:   Moves all extremities well, no edema, no cyanosis, no             redness   Pulses:   Pulses palpable and equal bilaterally   Skin:   No bleeding, bruising or rash   Lymph nodes:   No palpable adenopathy   Neurologic:   Awake, alert and oriented to basic information       LABS  Lab Results (last 24 hours)     Procedure Component Value Units Date/Time    Blood Culture - Blood, [[id number]]  (Normal) Collected:  05/08/18 0526    Specimen:  Blood from Hand, Right Updated:  05/10/18 0530     Blood Culture No growth at 2 days    Blood Culture - Blood, [[id number]]  (Normal) Collected:  05/08/18 0513    Specimen:  Blood from Hand, Right Updated:  05/10/18 0530     Blood Culture No growth at 2 days    Comprehensive Metabolic Panel [[id number]]  (Abnormal) Collected:  05/10/18 0026    Specimen:  Blood Updated:  05/10/18 0201     Glucose 75 mg/dL      BUN 28 (H) mg/dL      Creatinine 1.21 mg/dL      Sodium 140 mmol/L      Potassium 3.2 (L) mmol/L      Chloride 104 mmol/L      CO2 24.7 mmol/L      Calcium 8.2 mg/dL      Total Protein 6.8 g/dL      Albumin 3.30 (L) g/dL      ALT (SGPT) 32 U/L      AST (SGOT) 21 U/L      Alkaline Phosphatase 198 (H) U/L      Comment: Note New Reference Ranges        Total Bilirubin 0.3 mg/dL      eGFR Non African Amer 42 (L) mL/min/1.73      Globulin 3.5 gm/dL      A/G Ratio 0.9 (L) g/dL      BUN/Creatinine Ratio 23.1     Anion Gap 11.3 (H) mmol/L     Narrative:       The MDRD GFR formula is only valid for adults with stable renal function between ages 18 and 70.    Osmolality, Calculated [[id number]]  (Normal) Collected:  05/10/18 0026    Specimen:  Blood Updated:  05/10/18 0201     Osmolality Calc 283.6 mOsm/kg     CBC & Differential [[id number]] Collected:  05/10/18 0026    Specimen:  Blood Updated:  05/10/18 0138    Narrative:       The following orders were created for panel order CBC & Differential.  Procedure                               Abnormality         Status                      ---------                               -----------         ------                     CBC Auto Differential[[id number]]        Abnormal            Final result                 Please view results for these tests on the individual orders.    CBC Auto Differential [[id number]]  (Abnormal) Collected:  05/10/18 0026    Specimen:  Blood Updated:  05/10/18 0138     WBC 7.89 10*3/mm3      RBC 3.00 (L) 10*6/mm3      Hemoglobin 9.3 (L) g/dL      Hematocrit 30.1 (L) %      .3 (H) fL      MCH 31.0 pg      MCHC 30.9 (L) g/dL      RDW 13.9 %      RDW-SD 49.0 fl      MPV 9.5 fL      Platelets 334 10*3/mm3      Neutrophil % 74.7 %      Lymphocyte % 13.7 (L) %      Monocyte % 11.2 %      Eosinophil % 0.0 %      Basophil % 0.0 %      Immature Grans % 0.4 %      Neutrophils, Absolute 5.90 10*3/mm3      Lymphocytes, Absolute 1.08 10*3/mm3      Monocytes, Absolute 0.88 10*3/mm3      Eosinophils, Absolute 0.00 10*3/mm3      Basophils, Absolute 0.00 10*3/mm3      Immature Grans, Absolute 0.03 10*3/mm3     Mycoplasma Pneumoniae Antibody, IgM [[id number]]  (Abnormal) Collected:  05/09/18 0202    Specimen:  Blood Updated:  05/09/18 1239     Mycoplasma pneumo IgM Positive (C)    Legionella Antigen, Urine - Urine, Urine, Clean Catch [[id number]]  (Normal) Collected:  05/09/18 0946    Specimen:  Urine from Urine, Clean Catch Updated:  05/09/18 1136     LEGIONELLA ANTIGEN, URINE Negative    Narrative:         Presumptive negative for L. pneumophilia serogroup 1 antigen, suggesting no recent or current infection.    Urinalysis, Microscopic Only - Urine, Clean Catch [[id number]]  (Abnormal) Collected:  05/09/18 0946    Specimen:  Urine from Urine, Clean Catch Updated:  05/09/18 1048     RBC, UA 0-2 /HPF      WBC, UA 3-6 (A) /HPF      Bacteria, UA Trace (A) /HPF      Squamous Epithelial Cells, UA 3-6 (A) /HPF      Renal Epithelial Cells, UA 7-12 (A) /HPF      Hyaline Casts, UA 3-6 /LPF      Methodology Manual Light Microscopy     Urinalysis With / Culture If Indicated - Urine, Clean Catch [[id number]]  (Abnormal) Collected:  05/09/18 0946    Specimen:  Urine from Urine, Clean Catch Updated:  05/09/18 1043     Color, UA Yellow     Appearance, UA Clear     pH, UA 6.5     Specific Gravity, UA 1.013     Glucose, UA Negative     Ketones, UA Negative     Bilirubin, UA Negative     Blood, UA Negative     Protein, UA Negative     Leuk Esterase, UA Moderate (2+) (A)     Nitrite, UA Negative     Urobilinogen, UA 0.2 E.U./dL    Urine Culture - Urine, Urine, Clean Catch [[id number]] Collected:  05/09/18 0946    Specimen:  Urine from Urine, Clean Catch Updated:  05/09/18 1042          IMAGING  Imaging Results (last 72 hours)     Procedure Component Value Units Date/Time    XR Chest AP [[id number]] Collected:  05/09/18 0900     Updated:  05/09/18 0903    Narrative:       XR CHEST AP-     CLINICAL INDICATION: Fever, cough.          COMPARISON: None available.      TECHNIQUE: Single frontal view of the chest.     FINDINGS:     Right infrahilar consolidation.  The cardiac silhouette is normal. The pulmonary vasculature is  unremarkable.  There is no evidence of an acute osseous abnormality.   There are no suspicious-appearing parenchymal soft tissue nodules.            Impression:       Right infrahilar consolidation.              This report was finalized on 5/9/2018 9:01 AM by Dr. Jassi Babcock MD.       US Abdomen Complete [[id number]] Collected:  05/08/18 1058     Updated:  05/08/18 1105    Narrative:       US ABDOMEN COMPLETE-      HISTORY:   Epigastric pain, fever, history of cholangitis          COMPARISON: None available.     FINDINGS: Sonographic imaging of the abdomen shows no evidence of a  pancreatic mass.     The liver is homogeneous in echotexture. There is no intrahepatic ductal  dilatation or focal hepatic mass. Shadowing from the gallbladder fossa  appears to be due to air. The patient has had previous cholecystectomy.     The abdominal aorta  does not appear to be dilated. Common bile duct is 1  cm.     Kidneys show no hydronephrosis and the spleen is homogeneous.       Impression:       Common bile duct is prominent.      This report was finalized on 5/8/2018 10:59 AM by Dr. Jassi Babcock MD.             Assessment/Plan   -prominent common bile duct  -epigastric pain  -nausea  -constipation  -fever  -history of cholangitis with stent placement  -Sepsis that was present upon admission with fever and leukocytosis; unclear etiololgy  -Mild LFT elevation  -Alkaline phosphatase elevation  -Macrocytic anemia with unknown baseline  -Essential hypertension, controlled  -History of CAD with no complaints of chest pain  -History of CHF, combined dysfunction; appears euvolemic  -History of liver disease  -Hypothyroidism  -History of breast cancer     An ERCP will be performed.  Procedure was explained to the patient who voiced understanding and agreement.  Concur with present management.  Thank you for allowing us to participate in the care of your patient.    The patient was seen and evaluated by Dr. Clark including history of present illness, physical examination, assessment, treatment plan.  The note above was reviewed by Dr. Clark--agree with all findings and recommendations.    Electronically signed by: FLOR Armas     CC:  Dr. Aviva Gonzalez

## 2018-05-10 NOTE — ANESTHESIA POSTPROCEDURE EVALUATION
Patient: Zoe Thakkar    Procedure Summary     Date:  05/10/18 Room / Location:  [facility] OR  /  [facility] OR    Anesthesia Start:  1507 Anesthesia Stop:  1626    Procedure:  ENDOSCOPIC RETROGRADE CHOLANGIOPANCREATOGRAPHY (N/A ) Diagnosis:       Common bile duct dilatation      H/O cholangitis      (Common bile duct dilatation [K83.8])      (H/O cholangitis [Z87.19])    Surgeon:  Nuno Clark III, MD Provider:  Justin Villafana MD    Anesthesia Type:  general ASA Status:  3          Anesthesia Type: general  Last vitals  BP   144/69 (05/10/18 1638)   Temp   98 °F (36.7 °C) (05/10/18 1628)   Pulse   94 (05/10/18 1638)   Resp   20 (05/10/18 1638)     SpO2   100 % (05/10/18 1638)     Post Anesthesia Care and Evaluation    Patient location during evaluation: PACU  Patient participation: complete - patient participated  Level of consciousness: awake and alert  Pain score: 1  Pain management: adequate  Airway patency: patent  Anesthetic complications: No anesthetic complications  PONV Status: controlled  Cardiovascular status: acceptable  Respiratory status: acceptable  Hydration status: acceptable

## 2018-05-10 NOTE — ANESTHESIA PROCEDURE NOTES
Airway  Urgency: elective    Date/Time: 5/10/2018 3:13 PM  Airway not difficult    General Information and Staff    Patient location during procedure: OR  Anesthesiologist: KIM FRAZIER  CRNA: RODGER ACEVES    Indications and Patient Condition  Indications for airway management: airway protection    Preoxygenated: yes  MILS maintained throughout  Mask difficulty assessment: 0 - not attempted    Final Airway Details  Final airway type: endotracheal airway      Successful airway: ETT  Cuffed: yes   Successful intubation technique: direct laryngoscopy  Facilitating devices/methods: intubating stylet  Endotracheal tube insertion site: oral  Blade: Sarah  Blade size: #3  ETT size: 7.5 mm  Cormack-Lehane Classification: grade I - full view of glottis  Placement verified by: chest auscultation, capnometry and palpation of cuff   Cuff volume (mL): 10  Measured from: lips  ETT to lips (cm): 21  Number of attempts at approach: 1    Additional Comments  Dentition and lips same as preop

## 2018-05-10 NOTE — OP NOTE
"05/10/18    ENDOSCOPIC RETROGRADE CHOLANGIOPANCREATOGRAPHY with biliary sphincterotomy and biliary stent placement  Procedure Note    Zoe Thakkar  5/8/2018 - 5/10/2018    Dr. Clark      Pre-op Diagnosis: Abdominal pain, fever, suspected cholangitis      Post-op Diagnosis:   -Choledocholithiasis  -Retained biliary stent--unable to remove  -New biliary stent placement        Anesthesia: Per Anesthesia service, general anesthesia      Estimated Blood Loss: Negligible      Findings: The patient was examined in the prone position.  Brief inspection of the esophagus, stomach, duodenum revealed no obvious abnormality.  The ampulla of Vater was identified.  Retrograde cholangiogram showed diffuse dilation of the CBD associated with numerous filling defects in the CBD consistent with choledocholithiasis.  A biliary stent had previously been placed and this had migrated up into the bile duct--it was not visible in the duodenum.  Some contrast refluxed into the pancreatic duct and showed no obvious abnormality (although the PD was not well-visualized by this study).  Utilizing a sphincterotome, biliary sphincterotomy was carefully performed.  Attempts were made to remove the previously placed stent using both a balloon tipped catheter and snare, but this was unsuccessful.  The bile duct was \"swept\" using an inflated balloon tipped catheter with removal of some sludge and debris from the bile duct.  It appeared unlikely to me that the bile duct could be completely cleared of stones.  I elected to place a new biliary stent into the bile duct.  A 10 [place]/9 cm stent was inserted into the bile duct and the end of the stent was left protruding into the duodenum.  Excellent drainage of bilious fluid through the stent into the duodenum was observed.  When I was satisfied that the stent was in good position and functioning well, the scope was withdrawn.  The stomach and duodenum were suctioned and decompressed.  The patient left " the OR in stable and satisfactory condition.      Specimens: None      Grafts/Implants: N/A      Complications: None      Recommendations:   Diet as tolerated    Nuno Clark III, MD     Date: 5/10/2018  Time: 4:24 PM     CC:  Dr. Aviva Gonzalez

## 2018-05-10 NOTE — PLAN OF CARE
Problem: Patient Care Overview  Goal: Plan of Care Review  Outcome: Ongoing (interventions implemented as appropriate)      Problem: Fall Risk (Adult)  Goal: Identify Related Risk Factors and Signs and Symptoms  Outcome: Outcome(s) achieved Date Met: 05/10/18    Goal: Absence of Fall  Outcome: Ongoing (interventions implemented as appropriate)      Problem: Skin Injury Risk (Adult)  Goal: Identify Related Risk Factors and Signs and Symptoms  Outcome: Outcome(s) achieved Date Met: 05/10/18    Goal: Skin Health and Integrity  Outcome: Ongoing (interventions implemented as appropriate)      Problem: Infection, Risk/Actual (Adult)  Goal: Identify Related Risk Factors and Signs and Symptoms  Outcome: Outcome(s) achieved Date Met: 05/10/18    Goal: Infection Prevention/Resolution  Outcome: Ongoing (interventions implemented as appropriate)

## 2018-05-11 LAB
ALBUMIN SERPL-MCNC: 3.4 G/DL (ref 3.4–4.8)
ALBUMIN/GLOB SERPL: 0.9 G/DL (ref 1.5–2.5)
ALP SERPL-CCNC: 187 U/L (ref 35–104)
ALT SERPL W P-5'-P-CCNC: 29 U/L (ref 10–36)
ANION GAP SERPL CALCULATED.3IONS-SCNC: 12.1 MMOL/L (ref 3.6–11.2)
AST SERPL-CCNC: 21 U/L (ref 10–30)
BACTERIA SPEC AEROBE CULT: NORMAL
BASOPHILS # BLD AUTO: 0.01 10*3/MM3 (ref 0–0.3)
BASOPHILS NFR BLD AUTO: 0.1 % (ref 0–2)
BILIRUB SERPL-MCNC: 0.2 MG/DL (ref 0.2–1.8)
BUN BLD-MCNC: 27 MG/DL (ref 7–21)
BUN/CREAT SERPL: 23.3 (ref 7–25)
CALCIUM SPEC-SCNC: 8.6 MG/DL (ref 7.7–10)
CHLORIDE SERPL-SCNC: 104 MMOL/L (ref 99–112)
CO2 SERPL-SCNC: 23.9 MMOL/L (ref 24.3–31.9)
CREAT BLD-MCNC: 1.16 MG/DL (ref 0.43–1.29)
CRP SERPL-MCNC: 4.21 MG/DL (ref 0–0.99)
DEPRECATED RDW RBC AUTO: 49.5 FL (ref 37–54)
EOSINOPHIL # BLD AUTO: 0 10*3/MM3 (ref 0–0.7)
EOSINOPHIL NFR BLD AUTO: 0 % (ref 0–7)
ERYTHROCYTE [DISTWIDTH] IN BLOOD BY AUTOMATED COUNT: 13.8 % (ref 11.5–14.5)
GFR SERPL CREATININE-BSD FRML MDRD: 44 ML/MIN/1.73
GLOBULIN UR ELPH-MCNC: 3.8 GM/DL
GLUCOSE BLD-MCNC: 81 MG/DL (ref 70–110)
HCT VFR BLD AUTO: 32.8 % (ref 37–47)
HEMOCCULT STL QL: NEGATIVE
HGB BLD-MCNC: 10.2 G/DL (ref 12–16)
IMM GRANULOCYTES # BLD: 0.03 10*3/MM3 (ref 0–0.03)
IMM GRANULOCYTES NFR BLD: 0.3 % (ref 0–0.5)
LYMPHOCYTES # BLD AUTO: 1.46 10*3/MM3 (ref 1–3)
LYMPHOCYTES NFR BLD AUTO: 12.3 % (ref 16–46)
MCH RBC QN AUTO: 31.6 PG (ref 27–33)
MCHC RBC AUTO-ENTMCNC: 31.1 G/DL (ref 33–37)
MCV RBC AUTO: 101.5 FL (ref 80–94)
MONOCYTES # BLD AUTO: 0.78 10*3/MM3 (ref 0.1–0.9)
MONOCYTES NFR BLD AUTO: 6.6 % (ref 0–12)
NEUTROPHILS # BLD AUTO: 9.58 10*3/MM3 (ref 1.4–6.5)
NEUTROPHILS NFR BLD AUTO: 80.7 % (ref 40–75)
OSMOLALITY SERPL CALC.SUM OF ELEC: 283.5 MOSM/KG (ref 273–305)
PLATELET # BLD AUTO: 365 10*3/MM3 (ref 130–400)
PMV BLD AUTO: 9.3 FL (ref 6–10)
POTASSIUM BLD-SCNC: 3.3 MMOL/L (ref 3.5–5.3)
PROT SERPL-MCNC: 7.2 G/DL (ref 6–8)
RBC # BLD AUTO: 3.23 10*6/MM3 (ref 4.2–5.4)
SODIUM BLD-SCNC: 140 MMOL/L (ref 135–153)
WBC NRBC COR # BLD: 11.86 10*3/MM3 (ref 4.5–12.5)

## 2018-05-11 PROCEDURE — 86140 C-REACTIVE PROTEIN: CPT | Performed by: INTERNAL MEDICINE

## 2018-05-11 PROCEDURE — 82272 OCCULT BLD FECES 1-3 TESTS: CPT | Performed by: INTERNAL MEDICINE

## 2018-05-11 PROCEDURE — 85025 COMPLETE CBC W/AUTO DIFF WBC: CPT | Performed by: INTERNAL MEDICINE

## 2018-05-11 PROCEDURE — 80053 COMPREHEN METABOLIC PANEL: CPT | Performed by: INTERNAL MEDICINE

## 2018-05-11 PROCEDURE — 99232 SBSQ HOSP IP/OBS MODERATE 35: CPT | Performed by: INTERNAL MEDICINE

## 2018-05-11 PROCEDURE — 25010000002 CEFTRIAXONE: Performed by: INTERNAL MEDICINE

## 2018-05-11 PROCEDURE — 99232 SBSQ HOSP IP/OBS MODERATE 35: CPT | Performed by: PHYSICIAN ASSISTANT

## 2018-05-11 PROCEDURE — 94799 UNLISTED PULMONARY SVC/PX: CPT

## 2018-05-11 RX ADMIN — ASPIRIN AND DIPYRIDAMOLE 1 CAPSULE: 25; 200 CAPSULE, EXTENDED RELEASE ORAL at 09:39

## 2018-05-11 RX ADMIN — DOXEPIN HYDROCHLORIDE 50 MG: 25 CAPSULE ORAL at 09:38

## 2018-05-11 RX ADMIN — ASPIRIN AND DIPYRIDAMOLE 1 CAPSULE: 25; 200 CAPSULE, EXTENDED RELEASE ORAL at 21:49

## 2018-05-11 RX ADMIN — Medication 10 ML: at 21:48

## 2018-05-11 RX ADMIN — Medication 1 CAPSULE: at 09:39

## 2018-05-11 RX ADMIN — CARVEDILOL 3.12 MG: 3.12 TABLET, FILM COATED ORAL at 09:38

## 2018-05-11 RX ADMIN — LEVOTHYROXINE SODIUM 100 MCG: 100 TABLET ORAL at 09:39

## 2018-05-11 RX ADMIN — Medication 10 ML: at 09:45

## 2018-05-11 RX ADMIN — DOCUSATE SODIUM 100 MG: 100 CAPSULE, LIQUID FILLED ORAL at 21:49

## 2018-05-11 RX ADMIN — DOCUSATE SODIUM 100 MG: 100 CAPSULE, LIQUID FILLED ORAL at 09:38

## 2018-05-11 RX ADMIN — DOXEPIN HYDROCHLORIDE 50 MG: 25 CAPSULE ORAL at 21:49

## 2018-05-11 RX ADMIN — NITROGLYCERIN 1 PATCH: 0.1 PATCH TRANSDERMAL at 09:39

## 2018-05-11 RX ADMIN — FUROSEMIDE 40 MG: 40 TABLET ORAL at 09:39

## 2018-05-11 RX ADMIN — CASTOR OIL AND BALSAM, PERU: 788; 87 OINTMENT TOPICAL at 09:44

## 2018-05-11 RX ADMIN — CEFTRIAXONE 1 G: 1 INJECTION, POWDER, FOR SOLUTION INTRAMUSCULAR; INTRAVENOUS at 13:29

## 2018-05-11 RX ADMIN — POVIDONE-IODINE: 100 OINTMENT TOPICAL at 09:44

## 2018-05-11 RX ADMIN — CARVEDILOL 3.12 MG: 3.12 TABLET, FILM COATED ORAL at 17:37

## 2018-05-11 RX ADMIN — DOXYCYCLINE 100 MG: 100 INJECTION, POWDER, LYOPHILIZED, FOR SOLUTION INTRAVENOUS at 15:15

## 2018-05-11 RX ADMIN — POVIDONE-IODINE: 100 OINTMENT TOPICAL at 21:48

## 2018-05-11 RX ADMIN — DOXYCYCLINE 100 MG: 100 INJECTION, POWDER, LYOPHILIZED, FOR SOLUTION INTRAVENOUS at 02:05

## 2018-05-11 RX ADMIN — FUROSEMIDE 40 MG: 40 TABLET ORAL at 17:37

## 2018-05-11 RX ADMIN — CASTOR OIL AND BALSAM, PERU: 788; 87 OINTMENT TOPICAL at 21:48

## 2018-05-11 NOTE — PROGRESS NOTES
Antimicrobial Length of Therapy:    Day 3 of 7 ceftriaxone  Day 3 of 7 doxycycline    Day 4 total antibiotic therapy    Thank you.  Yajaira Hernandez, Pharm.D.  5/11/2018  4:23 PM

## 2018-05-11 NOTE — PROGRESS NOTES
05/11/18      Zoe Thakkar is a 89 y.o. female who is seen today for follow up of fever and epigastric pain with history of cholangitis and stent placement    HPI  The patient had an ERCP yesterday which revealed multiple gallstones in the common bile duct, some which were unable to be removed.  A retained biliary stent from 1 year ago was also found.  Removal was attempted but was unsuccessful.  A new biliary stent was placed and a sphincterotomy was performed.  Alkaline phosphatase is 187.  Other liver chemistries are normal.  The patient states that her symptoms have significantly improved.  She is feeling well and has requested a diet upgrade.    Past medical history, social history, and family history remain unchanged since initial consultation.    REVIEW OF SYSTEMS  All systems are negative    CURRENT MEDICATION    Current Facility-Administered Medications:   •  acetaminophen (TYLENOL) suppository 650 mg, 650 mg, Rectal, Q4H PRN, Aviva Gonzalez DO  •  acetaminophen (TYLENOL) tablet 650 mg, 650 mg, Oral, Q6H PRN, Aviva Gonzalez DO  •  aspirin-dipyridamole (AGGRENOX)  MG per 12 hr capsule 1 capsule, 1 capsule, Oral, BID, Aviva Gonzalez DO, 1 capsule at 05/11/18 0939  •  carvedilol (COREG) tablet 3.125 mg, 3.125 mg, Oral, BID With Meals, Aviva Gonzalez DO, 3.125 mg at 05/11/18 0938  •  castor oil-balsam peru (VENELEX) ointment, , Topical, Q12H, Cj Gaines MD  •  cefTRIAXone (ROCEPHIN) 1 g/100 mL 0.9% NS (MBP), 1 g, Intravenous, Q24H, Cj Gaines MD, 1 g at 05/11/18 1329  •  docusate sodium (COLACE) capsule 100 mg, 100 mg, Oral, BID, Aviva Gonzalez DO, 100 mg at 05/11/18 0938  •  doxepin (SINEquan) capsule 50 mg, 50 mg, Oral, BID, Aviva Gonzalez DO, 50 mg at 05/11/18 0938  •  doxycycline (VIBRAMYCIN) 100 mg/100 mL 0.9% NS MBP, 100 mg, Intravenous, Q12H, Cj Gaines MD, 100 mg at 05/11/18 1515  •  furosemide (LASIX) tablet 40 mg, 40 mg, Oral, BID,  Aviva Gonzalez DO, 40 mg at 05/11/18 0939  •  HYDROcodone-acetaminophen (NORCO)  MG per tablet 1 tablet, 1 tablet, Oral, BID PRN, Aviva Gonzalez DO, 1 tablet at 05/09/18 2002  •  lactated ringers infusion, 125 mL/hr, Intravenous, Continuous, Justin Villafana MD  •  lactobacillus acidophilus (RISAQUAD) capsule 1 capsule, 1 capsule, Oral, Daily, FLOR Travis, 1 capsule at 05/11/18 0939  •  levothyroxine (SYNTHROID, LEVOTHROID) tablet 100 mcg, 100 mcg, Oral, Daily, Aviva Gonzalez DO, 100 mcg at 05/11/18 0939  •  nitroglycerin (NITRODUR) 0.1 MG/HR patch 1 patch, 1 patch, Transdermal, Daily, Aviva Gonzaelz DO, 1 patch at 05/11/18 0939  •  nitroglycerin (NITROSTAT) SL tablet 0.4 mg, 0.4 mg, Sublingual, Q5 Min PRN, Aviva Gonzalez DO  •  povidone-iodine (BETADINE) ointment, , Topical, Q12H, Cj Gaines MD  •  sodium chloride 0.9 % flush 1-10 mL, 1-10 mL, Intravenous, PRN, Aviva Gonzalez DO  •  sodium chloride 0.9 % flush 10 mL, 10 mL, Intracatheter, Q12H, Cj Gaines MD, 10 mL at 05/11/18 0945  •  sodium chloride 0.9 % flush 10 mL, 10 mL, Intracatheter, PRN, Cj Gaines MD  •  sodium chloride 0.9 % infusion, 75 mL/hr, Intravenous, Continuous, Aviva Gonzalez DO, Last Rate: 75 mL/hr at 05/10/18 2017, 75 mL/hr at 05/10/18 2017    ALLERGIES  Tuberculin tests     VITAL SIGNS  Vital Signs (last 24 hours)       05/10 0700  -  05/11 0659 05/11 0700  -  05/11 1627   Most Recent    Temp (°F) 97.3 -  98.5      98.6     98.6 (37)    Heart Rate 65 -  94      68     68    Resp 18 -  20      20     20    /69 -  (!) 181/88      144/59     144/59    SpO2 (%) 94 -  100      95     95            PHYSICAL EXAM  General:  Appearance alert, pleasant, interactive and cooperative  Head:  normocephalic, without obvious abnormality and atraumatic  Eyes:  lids and lashes normal, conjunctivae and sclerae normal, no icterus, no pallor, corneas clear and PERRLA  Ears:   ears appear intact with no abnormalities noted  Nose:  nares normal, septum midline, mucosa normal and no drainage  Throat:  no oral lesions, no thrush and oral mucosa moist  Lungs:  clear to auscultation, respirations regular, respirations even and respirations unlabored  Heart:  regular rhythm & normal rate, normal S1, S2, no murmur, no gallop, no rub and no click  Abdomen:  normal bowel sounds, no masses, no hepatomegaly, no splenomegaly, soft non-tender, no guarding and no rebound tenderness  Extremities:  moves extremities well, no edema, no cyanosis and no redness  Skin:  no bleeding, bruising or rash, color normal, no lesions noted and temperature normal  Neurologic:  Mental Status orientated to person, place, time and situation, alertness alert, awake and arousable, orientation time, date, person, place, city and president and mood/affect:  normal      LABS   Lab Results (last 24 hours)     Procedure Component Value Units Date/Time    Urine Culture - Urine, Urine, Clean Catch [[id number]] Collected:  05/09/18 0946    Specimen:  Urine from Urine, Clean Catch Updated:  05/11/18 1149     Urine Culture <10,000 CFU/mL Normal Urogenital Nola    Blood Culture - Blood, [[id number]]  (Normal) Collected:  05/08/18 0513    Specimen:  Blood from Hand, Right Updated:  05/11/18 0530     Blood Culture No growth at 3 days    Blood Culture - Blood, [[id number]]  (Normal) Collected:  05/08/18 0526    Specimen:  Blood from Hand, Right Updated:  05/11/18 0530     Blood Culture No growth at 3 days    Comprehensive Metabolic Panel [[id number]]  (Abnormal) Collected:  05/11/18 0311    Specimen:  Blood Updated:  05/11/18 0407     Glucose 81 mg/dL      BUN 27 (H) mg/dL      Creatinine 1.16 mg/dL      Sodium 140 mmol/L      Potassium 3.3 (L) mmol/L      Chloride 104 mmol/L      CO2 23.9 (L) mmol/L      Calcium 8.6 mg/dL      Total Protein 7.2 g/dL      Albumin 3.40 g/dL      ALT (SGPT) 29 U/L      AST (SGOT) 21 U/L      Alkaline  Phosphatase 187 (H) U/L      Comment: Note New Reference Ranges        Total Bilirubin 0.2 mg/dL      eGFR Non African Amer 44 (L) mL/min/1.73      Globulin 3.8 gm/dL      A/G Ratio 0.9 (L) g/dL      BUN/Creatinine Ratio 23.3     Anion Gap 12.1 (H) mmol/L     Narrative:       The MDRD GFR formula is only valid for adults with stable renal function between ages 18 and 70.    Osmolality, Calculated [[id number]]  (Normal) Collected:  05/11/18 0311    Specimen:  Blood Updated:  05/11/18 0407     Osmolality Calc 283.5 mOsm/kg     C-reactive Protein [[id number]]  (Abnormal) Collected:  05/11/18 0311    Specimen:  Blood Updated:  05/11/18 0405     C-Reactive Protein 4.21 (H) mg/dL     CBC & Differential [[id number]] Collected:  05/11/18 0311    Specimen:  Blood Updated:  05/11/18 0357    Narrative:       The following orders were created for panel order CBC & Differential.  Procedure                               Abnormality         Status                     ---------                               -----------         ------                     CBC Auto Differential[[id number]]        Abnormal            Final result                 Please view results for these tests on the individual orders.    CBC Auto Differential [[id number]]  (Abnormal) Collected:  05/11/18 0311    Specimen:  Blood Updated:  05/11/18 0357     WBC 11.86 10*3/mm3      RBC 3.23 (L) 10*6/mm3      Hemoglobin 10.2 (L) g/dL      Hematocrit 32.8 (L) %      .5 (H) fL      MCH 31.6 pg      MCHC 31.1 (L) g/dL      RDW 13.8 %      RDW-SD 49.5 fl      MPV 9.3 fL      Platelets 365 10*3/mm3      Neutrophil % 80.7 (H) %      Lymphocyte % 12.3 (L) %      Monocyte % 6.6 %      Eosinophil % 0.0 %      Basophil % 0.1 %      Immature Grans % 0.3 %      Neutrophils, Absolute 9.58 (H) 10*3/mm3      Lymphocytes, Absolute 1.46 10*3/mm3      Monocytes, Absolute 0.78 10*3/mm3      Eosinophils, Absolute 0.00 10*3/mm3      Basophils, Absolute 0.01 10*3/mm3      Immature  Grans, Absolute 0.03 10*3/mm3           IMAGING  Imaging Results (last 24 hours)     Procedure Component Value Units Date/Time    FL Surgery Fluoro [[id number]] Collected:  05/10/18 1629     Updated:  05/10/18 1642    Narrative:       FLUOROSCOPIC SURGERY FLUORO-      CLINICAL INDICATION: ERCP; K83.8-Other specified diseases of biliary  tract; Z87.19-Personal history of other diseases of the digestive  system.       COMPARISON: None available.     Total fluoroscopy time 602.9 seconds.     Fluoroscopy was provided and 3 images were acquired. There is what  appears to be an internal biliary stent present on the presented images.     For a complete report, please see the report provided by the performing  physician.           This report was finalized on 5/10/2018 4:39 PM by Dr. Jassi Babcock MD.               ASSESSMENT/PLAN  -prominent common bile duct  -epigastric pain  -nausea  -constipation  -fever  -history of cholangitis with stent placement  -Sepsis that was present upon admission with fever and leukocytosis; unclear etiololgy  -Mild LFT elevation  -Alkaline phosphatase elevation  -Macrocytic anemia with unknown baseline  -Essential hypertension, controlled  -History of CAD with no complaints of chest pain  -History of CHF, combined dysfunction; appears euvolemic  -History of liver disease  -Hypothyroidism  -History of breast cancer    The patient is feeling well and would like for her diet to be upgraded.  She will be started on a regular diet.  GI will stop daily visits but recommends for patient to be seen in our outpatient office in 3-4 weeks.          Electronically signed by: FLOR Armas

## 2018-05-11 NOTE — PLAN OF CARE
Problem: Patient Care Overview  Goal: Plan of Care Review  Outcome: Ongoing (interventions implemented as appropriate)   05/10/18 1239 05/11/18 0950   Coping/Psychosocial   Plan of Care Reviewed With --  patient   Plan of Care Review   Progress no change --        Problem: Fall Risk (Adult)  Goal: Absence of Fall  Outcome: Ongoing (interventions implemented as appropriate)      Problem: Skin Injury Risk (Adult)  Goal: Skin Health and Integrity  Outcome: Ongoing (interventions implemented as appropriate)      Problem: Infection, Risk/Actual (Adult)  Goal: Infection Prevention/Resolution  Outcome: Ongoing (interventions implemented as appropriate)

## 2018-05-11 NOTE — PROGRESS NOTES
[facility] HOSPITALIST PROGRESS NOTE     Patient Identification:  Name:  Zoe Thakkar  Age:  89 y.o.  Sex:  female  :  4/15/1926  MRN:  [id number]  Visit Number:  [id number]  Primary Care Provider:  Flako Webster MD    Length of stay:  3    Chief complaint:  89 y.o. old female admitted with sepsis due to pneumonia and UTI        Subjective:    No acute issues overnight.  Patient denies any new complaints.  Patient is lying comfortably in bed.           Current Hospital Meds:    aspirin-dipyridamole 1 capsule Oral BID   carvedilol 3.125 mg Oral BID With Meals   castor oil-balsam peru  Topical Q12H   ceftriaxone 1 g Intravenous Q24H   docusate sodium 100 mg Oral BID   doxepin 50 mg Oral BID   doxycycline 100 mg Intravenous Q12H   furosemide 40 mg Oral BID   lactobacillus acidophilus 1 capsule Oral Daily   levothyroxine 100 mcg Oral Daily   nitroglycerin 1 patch Transdermal Daily   povidone-iodine  Topical Q12H   sodium chloride 10 mL Intracatheter Q12H       lactated ringers 125 mL/hr    sodium chloride 75 mL/hr Last Rate: 75 mL/hr (05/10/18 2017)     ----------------------------------------------------------------------------------------------------------------------  Vital Signs:  Temp:  [97.3 °F (36.3 °C)-98.5 °F (36.9 °C)] 98.5 °F (36.9 °C)  Heart Rate:  [74-94] 74  Resp:  [18-20] 18  BP: (110-181)/(57-89) 141/57  1    18  0304   Weight: 65.8 kg (145 lb 1.6 oz)     Body mass index is 24.91 kg/m².    Intake/Output Summary (Last 24 hours) at 18 1341  Last data filed at 18 0400   Gross per 24 hour   Intake             1460 ml   Output                0 ml   Net             1460 ml     Diet Regular  ----------------------------------------------------------------------------------------------------------------------  Physical exam:  Constitutional:  Well-developed and well-nourished.   Elderly female lying in bed in no acute distress.  HENT:  Head:  Normocephalic and atraumatic.   Mouth:  Moist mucous membranes.    Eyes:  Conjunctivae and EOM are normal.  Pupils are equal, round, and reactive to light.   Neck:  Neck supple.  No JVD present.    Cardiovascular:  Regular rate and rhythm. S1+S2. Systolic murmur  Pulmonary/Chest: Clear to auscultation bilaterally.   Abdominal:  Soft.  Mild tenderness to palpation in mid abdomen and barium plical area.. No viscera palpable.  Bowel sounds audible.   Musculoskeletal: No deformity or joint swelling.   Peripheral vascular: Bilateral dorsalis pedis palpable. No edema.   Neurological:  Alert and oriented to person, place, and time.  Cranial nerves grossly intact. Strength bilaterally symmetrical in upper and lower extremities.   Skin:  Skin is warm and dry. No rash noted. No pallor.   ----------------------------------------------------------------------------------------------------------------------  Tele:  Sinus rhythm with first-degree AV block  ----------------------------------------------------------------------------------------------------------------------        Results from last 7 days  Lab Units 05/11/18  0311 05/10/18  1158 05/10/18  0026 05/09/18  0202  05/08/18  0246   CRP mg/dL 4.21* 5.26*  --  10.50*  < >  --    LACTATE mmol/L  --   --   --   --   --  0.9   WBC 10*3/mm3 11.86  --  7.89 8.09  --  14.08*   HEMOGLOBIN g/dL 10.2*  --  9.3* 9.6*  --  10.4*   HEMATOCRIT % 32.8*  --  30.1* 30.9*  --  32.6*   MCV fL 101.5*  --  100.3* 100.0*  --  100.6*   MCHC g/dL 31.1*  --  30.9* 31.1*  --  31.9*   PLATELETS 10*3/mm3 365  --  334 301  --  328   < > = values in this interval not displayed.        Results from last 7 days  Lab Units 05/11/18  0311 05/10/18  0026 05/09/18  0202   SODIUM mmol/L 140 140 143   POTASSIUM mmol/L 3.3* 3.2* 3.5   CHLORIDE mmol/L 104 104 107   CO2 mmol/L 23.9* 24.7 26.9   BUN mg/dL 27* 28* 34*   CREATININE mg/dL 1.16 1.21 1.23   EGFR IF NONAFRICN AM mL/min/1.73 44* 42* 41*   CALCIUM mg/dL 8.6 8.2 8.8   GLUCOSE mg/dL 81  75 88   ALBUMIN g/dL 3.40 3.30* 3.40   BILIRUBIN mg/dL 0.2 0.3 0.4   ALK PHOS U/L 187* 198* 217*   AST (SGOT) U/L 21 21 33*   ALT (SGPT) U/L 29 32 45*   Estimated Creatinine Clearance: 28.9 mL/min (by C-G formula based on SCr of 1.16 mg/dL).    No results found for: AMMONIA      Blood Culture   Date Value Ref Range Status   05/08/2018 No growth at less than 24 hours  Preliminary   05/08/2018 No growth at less than 24 hours  Preliminary                I have personally looked at the labs and they are summarized above.  ----------------------------------------------------------------------------------------------------------------------  Imaging Results (last 24 hours)     Procedure Component Value Units Date/Time    FL Surgery Fluoro [[id number]] Collected:  05/10/18 1629     Updated:  05/10/18 1642    Narrative:       FLUOROSCOPIC SURGERY FLUORO-      CLINICAL INDICATION: ERCP; K83.8-Other specified diseases of biliary  tract; Z87.19-Personal history of other diseases of the digestive  system.       COMPARISON: None available.     Total fluoroscopy time 602.9 seconds.     Fluoroscopy was provided and 3 images were acquired. There is what  appears to be an internal biliary stent present on the presented images.     For a complete report, please see the report provided by the performing  physician.           This report was finalized on 5/10/2018 4:39 PM by Dr. Jassi Babcock MD.           ----------------------------------------------------------------------------------------------------------------------  Assessment and Plan:    Sepsis  Improved.  Due to pneumonia and urinary tract infection.  Patient is afebrile and VS stable. Continue Rocephin and doxycycline.  WBC is within normal limits and CRP Is improving and is down to 4.  No growth on blood cultures so far.  Continue to follow inflammatory markers and culture results.    Community-acquired pneumonia  Chest x-ray showed infrahilar pneumonia.  Urine Legionella  antigen is negative and serology positive for mycoplasma IgM.  Continue Rocephin and doxycycline.  Continue isolation droplet precautions.  Follow-up on blood cultures.    Acute urinary tract infection  Urine culture showed normal urogenital christine.     LFT elevation, Resolved.  Ultrasound of the liver showed no acute abnormality.      Cholelithiasis   Patient has previous history of cholelithiasis with ERCP and stent placement.  Status post repeat ERCP and stent placement.      Essential hypertension  Controlled.  Continue Coreg and continue to monitor blood pressure.    History of CHF, combined systolic and diastolic  Compensated.  Continue beta blocker and Lasix.  Monitor I/O and daily weights.    Cerebrovascular disease  Continue Aggrenox and statin.    Hypokalemia  Replace potassium per protocol.    Prophylaxis  DVT: Heparin  GI: PPI.     Disposition  Patient will be discharged back to nursing home tomorrow.    The patient is high risk due to: Sepsis, UTI, pneumonia, LFT elevation, CHF    I discussed the patients findings and my recommendations with patient and nursing staff.    Cj Gaines MD  05/11/18  1:41 PM

## 2018-05-11 NOTE — PROGRESS NOTES
Discharge Planning Assessment   Josias     Patient Name: Zoe Thakkar  MRN: [id number]  Today's Date: 5/11/2018    Admit Date: 5/8/2018    Discharge Plan     Row Name 05/11/18 1152       Plan    Plan Pt was admitted from [facility] and has a bed hold until further notice. SS to follow.     14:48 SS discussed pt with Dr. Gaines who request to know about pt returning to nursing home today. SS contacted [facility] [phone] and was informed by billing that pt's insurance will need to be contacted prior to pt returning. SS left message for DON at [facility]. SS to follow.     15:13 SS contacted [facility] per Sharron FERMIN [phone] who states pt can be accepted back on Saturday, 5-12-18, however the orders and medication list need to be faxed prior to 12:00. SS notified Georgie RAY and Dr. Gaines. SS to follow.         Tammi Mccain

## 2018-05-12 VITALS
OXYGEN SATURATION: 95 % | TEMPERATURE: 98.9 F | HEART RATE: 76 BPM | WEIGHT: 145.1 LBS | RESPIRATION RATE: 20 BRPM | HEIGHT: 64 IN | DIASTOLIC BLOOD PRESSURE: 66 MMHG | SYSTOLIC BLOOD PRESSURE: 157 MMHG | BODY MASS INDEX: 24.77 KG/M2

## 2018-05-12 PROBLEM — A41.9 SEPSIS (HCC): Status: RESOLVED | Noted: 2018-05-08 | Resolved: 2018-05-12

## 2018-05-12 PROBLEM — Z87.19: Status: RESOLVED | Noted: 2018-05-07 | Resolved: 2018-05-12

## 2018-05-12 LAB
ALBUMIN SERPL-MCNC: 3.2 G/DL (ref 3.4–4.8)
ALBUMIN/GLOB SERPL: 0.9 G/DL (ref 1.5–2.5)
ALP SERPL-CCNC: 178 U/L (ref 35–104)
ALT SERPL W P-5'-P-CCNC: 26 U/L (ref 10–36)
ANION GAP SERPL CALCULATED.3IONS-SCNC: 9.5 MMOL/L (ref 3.6–11.2)
AST SERPL-CCNC: 22 U/L (ref 10–30)
BASOPHILS # BLD AUTO: 0 10*3/MM3 (ref 0–0.3)
BASOPHILS NFR BLD AUTO: 0 % (ref 0–2)
BILIRUB SERPL-MCNC: 0.1 MG/DL (ref 0.2–1.8)
BUN BLD-MCNC: 31 MG/DL (ref 7–21)
BUN/CREAT SERPL: 27 (ref 7–25)
CALCIUM SPEC-SCNC: 8.3 MG/DL (ref 7.7–10)
CHLORIDE SERPL-SCNC: 108 MMOL/L (ref 99–112)
CO2 SERPL-SCNC: 22.5 MMOL/L (ref 24.3–31.9)
CREAT BLD-MCNC: 1.15 MG/DL (ref 0.43–1.29)
CRP SERPL-MCNC: 2.9 MG/DL (ref 0–0.99)
DEPRECATED RDW RBC AUTO: 49.9 FL (ref 37–54)
EOSINOPHIL # BLD AUTO: 0 10*3/MM3 (ref 0–0.7)
EOSINOPHIL NFR BLD AUTO: 0 % (ref 0–7)
ERYTHROCYTE [DISTWIDTH] IN BLOOD BY AUTOMATED COUNT: 13.9 % (ref 11.5–14.5)
GFR SERPL CREATININE-BSD FRML MDRD: 44 ML/MIN/1.73
GLOBULIN UR ELPH-MCNC: 3.4 GM/DL
GLUCOSE BLD-MCNC: 92 MG/DL (ref 70–110)
HCT VFR BLD AUTO: 32.3 % (ref 37–47)
HGB BLD-MCNC: 10.1 G/DL (ref 12–16)
IMM GRANULOCYTES # BLD: 0.02 10*3/MM3 (ref 0–0.03)
IMM GRANULOCYTES NFR BLD: 0.3 % (ref 0–0.5)
LYMPHOCYTES # BLD AUTO: 1.28 10*3/MM3 (ref 1–3)
LYMPHOCYTES NFR BLD AUTO: 19.1 % (ref 16–46)
MAGNESIUM SERPL-MCNC: 2 MG/DL (ref 1.7–2.6)
MCH RBC QN AUTO: 31.7 PG (ref 27–33)
MCHC RBC AUTO-ENTMCNC: 31.3 G/DL (ref 33–37)
MCV RBC AUTO: 101.3 FL (ref 80–94)
MONOCYTES # BLD AUTO: 0.87 10*3/MM3 (ref 0.1–0.9)
MONOCYTES NFR BLD AUTO: 13 % (ref 0–12)
NEUTROPHILS # BLD AUTO: 4.53 10*3/MM3 (ref 1.4–6.5)
NEUTROPHILS NFR BLD AUTO: 67.6 % (ref 40–75)
OSMOLALITY SERPL CALC.SUM OF ELEC: 285.6 MOSM/KG (ref 273–305)
PLATELET # BLD AUTO: 313 10*3/MM3 (ref 130–400)
PMV BLD AUTO: 9.5 FL (ref 6–10)
POTASSIUM BLD-SCNC: 3.1 MMOL/L (ref 3.5–5.3)
PROT SERPL-MCNC: 6.6 G/DL (ref 6–8)
RBC # BLD AUTO: 3.19 10*6/MM3 (ref 4.2–5.4)
SODIUM BLD-SCNC: 140 MMOL/L (ref 135–153)
WBC NRBC COR # BLD: 6.7 10*3/MM3 (ref 4.5–12.5)

## 2018-05-12 PROCEDURE — 86140 C-REACTIVE PROTEIN: CPT | Performed by: INTERNAL MEDICINE

## 2018-05-12 PROCEDURE — 85025 COMPLETE CBC W/AUTO DIFF WBC: CPT | Performed by: INTERNAL MEDICINE

## 2018-05-12 PROCEDURE — 25010000002 CEFTRIAXONE: Performed by: INTERNAL MEDICINE

## 2018-05-12 PROCEDURE — 80053 COMPREHEN METABOLIC PANEL: CPT | Performed by: INTERNAL MEDICINE

## 2018-05-12 PROCEDURE — 83735 ASSAY OF MAGNESIUM: CPT | Performed by: PHYSICIAN ASSISTANT

## 2018-05-12 PROCEDURE — 99239 HOSP IP/OBS DSCHRG MGMT >30: CPT | Performed by: INTERNAL MEDICINE

## 2018-05-12 RX ORDER — POTASSIUM CHLORIDE 7.45 MG/ML
10 INJECTION INTRAVENOUS
Status: DISCONTINUED | OUTPATIENT
Start: 2018-05-12 | End: 2018-05-12 | Stop reason: HOSPADM

## 2018-05-12 RX ORDER — POTASSIUM CHLORIDE 1.5 G/1.77G
40 POWDER, FOR SOLUTION ORAL AS NEEDED
Status: DISCONTINUED | OUTPATIENT
Start: 2018-05-12 | End: 2018-05-12 | Stop reason: HOSPADM

## 2018-05-12 RX ORDER — POTASSIUM CHLORIDE 750 MG/1
40 CAPSULE, EXTENDED RELEASE ORAL AS NEEDED
Status: DISCONTINUED | OUTPATIENT
Start: 2018-05-12 | End: 2018-05-12 | Stop reason: HOSPADM

## 2018-05-12 RX ORDER — DOXYCYCLINE 100 MG/1
100 CAPSULE ORAL EVERY 12 HOURS SCHEDULED
Status: DISCONTINUED | OUTPATIENT
Start: 2018-05-12 | End: 2018-05-12 | Stop reason: HOSPADM

## 2018-05-12 RX ORDER — MAGNESIUM SULFATE HEPTAHYDRATE 40 MG/ML
2 INJECTION, SOLUTION INTRAVENOUS AS NEEDED
Status: DISCONTINUED | OUTPATIENT
Start: 2018-05-12 | End: 2018-05-12 | Stop reason: HOSPADM

## 2018-05-12 RX ORDER — POTASSIUM CHLORIDE 20 MEQ/1
40 TABLET, EXTENDED RELEASE ORAL EVERY 4 HOURS
Status: DISCONTINUED | OUTPATIENT
Start: 2018-05-12 | End: 2018-05-12 | Stop reason: HOSPADM

## 2018-05-12 RX ORDER — MAGNESIUM SULFATE 1 G/100ML
1 INJECTION INTRAVENOUS AS NEEDED
Status: DISCONTINUED | OUTPATIENT
Start: 2018-05-12 | End: 2018-05-12 | Stop reason: HOSPADM

## 2018-05-12 RX ORDER — DOXYCYCLINE HYCLATE 100 MG/1
100 TABLET, DELAYED RELEASE ORAL 2 TIMES DAILY
Qty: 8 TABLET | Refills: 0 | Status: SHIPPED | OUTPATIENT
Start: 2018-05-12 | End: 2018-05-16

## 2018-05-12 RX ADMIN — POVIDONE-IODINE: 100 OINTMENT TOPICAL at 09:43

## 2018-05-12 RX ADMIN — ASPIRIN AND DIPYRIDAMOLE 1 CAPSULE: 25; 200 CAPSULE, EXTENDED RELEASE ORAL at 09:42

## 2018-05-12 RX ADMIN — LEVOTHYROXINE SODIUM 100 MCG: 100 TABLET ORAL at 09:43

## 2018-05-12 RX ADMIN — DOCUSATE SODIUM 100 MG: 100 CAPSULE, LIQUID FILLED ORAL at 09:42

## 2018-05-12 RX ADMIN — Medication 1 CAPSULE: at 09:42

## 2018-05-12 RX ADMIN — DOXEPIN HYDROCHLORIDE 50 MG: 25 CAPSULE ORAL at 09:42

## 2018-05-12 RX ADMIN — CARVEDILOL 3.12 MG: 3.12 TABLET, FILM COATED ORAL at 09:42

## 2018-05-12 RX ADMIN — DOXYCYCLINE 100 MG: 100 CAPSULE ORAL at 15:34

## 2018-05-12 RX ADMIN — POTASSIUM CHLORIDE 40 MEQ: 1500 TABLET, EXTENDED RELEASE ORAL at 14:41

## 2018-05-12 RX ADMIN — POTASSIUM CHLORIDE 40 MEQ: 1500 TABLET, EXTENDED RELEASE ORAL at 11:58

## 2018-05-12 RX ADMIN — CASTOR OIL AND BALSAM, PERU: 788; 87 OINTMENT TOPICAL at 09:43

## 2018-05-12 RX ADMIN — SODIUM CHLORIDE, POTASSIUM CHLORIDE, SODIUM LACTATE AND CALCIUM CHLORIDE 125 ML/HR: 600; 310; 30; 20 INJECTION, SOLUTION INTRAVENOUS at 03:37

## 2018-05-12 RX ADMIN — FUROSEMIDE 40 MG: 40 TABLET ORAL at 09:42

## 2018-05-12 RX ADMIN — FUROSEMIDE 40 MG: 40 TABLET ORAL at 16:55

## 2018-05-12 RX ADMIN — DOXYCYCLINE 100 MG: 100 INJECTION, POWDER, LYOPHILIZED, FOR SOLUTION INTRAVENOUS at 03:37

## 2018-05-12 RX ADMIN — CEFTRIAXONE 1 G: 1 INJECTION, POWDER, FOR SOLUTION INTRAMUSCULAR; INTRAVENOUS at 14:40

## 2018-05-12 RX ADMIN — NITROGLYCERIN 1 PATCH: 0.1 PATCH TRANSDERMAL at 09:43

## 2018-05-12 RX ADMIN — CARVEDILOL 3.12 MG: 3.12 TABLET, FILM COATED ORAL at 16:55

## 2018-05-12 RX ADMIN — Medication 10 ML: at 09:43

## 2018-05-12 NOTE — NURSING NOTE
Contacted CHENTE Nicole at [facility] who states cannot take patient today due to no access to fax.  Notified Pratima with SS who will contact JENY Mcdermott of [facility] at [phone].

## 2018-05-12 NOTE — NURSING NOTE
Made Georgie RAY aware that the patient's K+ is 3.1 and is not on replacement. Also made her aware that the patient continued to have dark colored stool but the occult blood collected was negative yesterday.

## 2018-05-12 NOTE — NURSING NOTE
Contacted [facility] who states cannot transport patient.  Contacted [facility] at [phone] who will transport patient at 1700.

## 2018-05-12 NOTE — PLAN OF CARE
Problem: Patient Care Overview  Goal: Plan of Care Review  Outcome: Ongoing (interventions implemented as appropriate)   05/10/18 1239 05/12/18 0942   Coping/Psychosocial   Plan of Care Reviewed With --  patient   Plan of Care Review   Progress no change --        Problem: Fall Risk (Adult)  Goal: Absence of Fall  Outcome: Ongoing (interventions implemented as appropriate)      Problem: Skin Injury Risk (Adult)  Goal: Skin Health and Integrity  Outcome: Ongoing (interventions implemented as appropriate)      Problem: Infection, Risk/Actual (Adult)  Goal: Infection Prevention/Resolution  Outcome: Ongoing (interventions implemented as appropriate)

## 2018-05-12 NOTE — DISCHARGE SUMMARY
[facility] HOSPITALIST MEDICINE DISCHARGE SUMMARY    Patient Identification:  Name:  Zoe Thakkar  Age:  89 y.o.  Sex:  female  :  4/15/1926  MRN:  [id number]  Visit Number:  [id number]    Date of Admission: 2018  Date of Discharge:  2018     PCP: Flako Webster MD    DISCHARGE DIAGNOSIS  · Sepsis  · Pneumonia due to mycoplasma  · Acute urinary tract infection, ruled out.  Urine culture grew normal urogenital christine.  · Choledocholithiasis, status post ERCP and stent placement  · Essential hypertension  · Chronic systolic and diastolic CHF, compensated  · Cerebrovascular disease      CONSULTS   Gastroenterology    PROCEDURES PERFORMED  ERCP and stent placement    REASON FOR ADMISSION  Patient is a 89 y.o. female presented to [facility] complaining of fever.  Please see the admitting history and physical for further details.       HOSPITAL COURSE      89-year-old female with past medical history significant for ascending cholangitis, ERCP and stent placement by Dr. Kurtz approximately 1 year ago, CAD, Combined CHF and TIA who presented in transfer from [facility] with fever, slightly elevated LFTs and concern for  recurrent cholangitis.  She was found to have sepsis and was admitted to medical floor for further evaluation and management.  Next    Patient was started on broad-spectrum antibiotics for her sepsis.  Initially patient was thought to have sepsis due to possible cholangitis given her fever, elevated LFTs and history of stent placement.  Her LFTs however normalized.  Ultrasound of the liver showed no significant abnormality.  Patient was seen by gastroenterology and advised ERCP.  Patient had improvement in her sepsis with stable vital signs, no fever, normal WBCs and improving CRP.  Her serology was positive for mycoplasma antibodies and patient was put in droplet precautions and isolation.  Patient also had the urinalysis concerning for UTI and her  antibiotics were changed from Zosyn to Rocephin and doxycycline.  Patient underwent ERCP that showed retained stent which could not be removed as well as choledocholithiasis with multiple stones.  Patient had a stent placed with good drainage.  Patient's blood culture and urine culture showed no growth and her antibiotics will de-escalate it to doxycycline.    Pt is being discharged back to nursing home in stable condition.  She will follow up with gastroenterology and her primary care physician.    DISCHARGE DISPOSITION   Stable    DISCHARGE MEDICATIONS:    Patient also started on doxycycline 100 mg twice a day for next 4 days.   Zoe Thakkar   Home Medication Instructions BETSY:[id number]    Printed on:05/12/18 5614   Medication Information                      acetaminophen (TYLENOL) 500 MG tablet  Take 1,000 mg by mouth every night at bedtime.             aspirin-dipyridamole (AGGRENOX)  MG per 12 hr capsule  Take 1 capsule by mouth 2 (Two) Times a Day.             carvedilol (COREG) 3.125 MG tablet  Take 3.125 mg by mouth 2 (Two) Times a Day.             docusate sodium (COLACE) 100 MG capsule  Take 100 mg by mouth Daily.             doxepin (SINEquan) 50 MG capsule  Take 50 mg by mouth 2 (Two) Times a Day.             ferrous sulfate 325 (65 FE) MG tablet  Take 325 mg by mouth 2 (Two) Times a Day.             furosemide (LASIX) 40 MG tablet  Take 40 mg by mouth 2 (Two) Times a Day.             HYDROcodone-acetaminophen (NORCO)  MG per tablet  Take 1 tablet by mouth 2 (Two) Times a Day.             levothyroxine (SYNTHROID, LEVOTHROID) 100 MCG tablet  Take 100 mcg by mouth Daily.             nitroglycerin (NITRODUR) 0.1 MG/HR patch  Place 1 patch on the skin Daily. Remove patch at bedtime             nitroglycerin (NITROLINGUAL) 0.4 MG/SPRAY spray  Place 1 spray under the tongue Every 5 (Five) Minutes As Needed for Chest Pain.                 Diet Instructions     Diet: Regular       Discharge  Diet:  Regular          Activity Instructions     Activity as Tolerated           Additional Instructions for the Follow-ups that You Need to Schedule     Discharge Follow-up with PCP    As directed      Follow Up Details:  1 week           Follow-up Information     Flako Webster MD .    Specialty:  General Surgery  Why:  1 week  Contact information:  [address]  [facility] [address]  [place] [id number] [phone]                   TEST  RESULTS PENDING AT DISCHARGE   Order Current Status    Blood Culture - Blood, Preliminary result    Blood Culture - Blood, Preliminary result           Cj Gaines MD  05/12/18  10:47 AM    Please note that this discharge summary required more than 30 minutes to complete.    Please send a copy of this dictation to the following providers:    Flako Webster MD

## 2018-05-12 NOTE — NURSING NOTE
Received call back from [facility] and states will accept patient after 5pm.  Faxed AVS to [facility] at [phone].

## 2018-05-12 NOTE — NURSING NOTE
Called Report to [facility] at [phone]. Spoke with Tracey. Made her aware we were sending antibiotics and we had replaced her K+ today while at the hospital before discharge.

## 2018-05-12 NOTE — PLAN OF CARE
Problem: Patient Care Overview  Goal: Plan of Care Review  Outcome: Ongoing (interventions implemented as appropriate)   05/10/18 1239 05/10/18 1705 05/11/18 2020   Coping/Psychosocial   Plan of Care Reviewed With --  --  patient   Plan of Care Review   Progress no change --  --    OTHER   Outcome Summary --  goals met, pt ready for transfer  --      Goal: Individualization and Mutuality  Outcome: Ongoing (interventions implemented as appropriate)      Problem: Fall Risk (Adult)  Goal: Absence of Fall  Outcome: Ongoing (interventions implemented as appropriate)   05/11/18 1027   Fall Risk (Adult)   Absence of Fall making progress toward outcome       Problem: Skin Injury Risk (Adult)  Goal: Skin Health and Integrity  Outcome: Ongoing (interventions implemented as appropriate)   05/11/18 1027   Skin Injury Risk (Adult)   Skin Health and Integrity making progress toward outcome       Problem: Infection, Risk/Actual (Adult)  Goal: Infection Prevention/Resolution  Outcome: Ongoing (interventions implemented as appropriate)   05/11/18 1027   Infection, Risk/Actual (Adult)   Infection Prevention/Resolution making progress toward outcome

## 2018-05-13 LAB
BACTERIA SPEC AEROBE CULT: NORMAL
BACTERIA SPEC AEROBE CULT: NORMAL

## 2018-05-13 NOTE — PAYOR COMM NOTE
"[facility]   ANA RECINOS  PHONE  [phone]  FAX  [phone]    PATIENT D/C 5/12/18    Zoe Thakkar (89 y.o. Female)     Date of Birth Social Security Number Address Home Phone MRN    04/15/1926  [id number]   [place] [id number] [phone] [id number]    [employer] Marital Status          Taoism        Admission Date Admission Type Admitting Provider Attending Provider Department, Room/Bed    5/8/18 Elective Cj Gaines MD  [facility] [address], 3351/2S    Discharge Date Discharge Disposition Discharge Destination        5/12/2018 Skilled Nursing Facility (DC - External)              Attending Provider:  (none)   Allergies:  Tuberculin Tests    Isolation:  Droplet   Infection:  Mycoplasma pneumonia (05/09/18)   Code Status:  Prior    Ht:  162.6 cm (64\")   Wt:  65.8 kg (145 lb 1.6 oz)    Admission Cmt:  None   Principal Problem:  None                Active Insurance as of 5/8/2018     Primary Coverage     Payor Plan Insurance Group Employer/Plan Group    [facility] MEDICARE REPLACEMENT [facility] [id number]     Payor Plan Address Payor Plan Phone Number Effective From Effective To    [address] [address]  1/1/2018     [place] [address]       Subscriber Name Subscriber Birth Date Member ID       ZOE THAKKAR 4/15/1926 [id number]                 Emergency Contacts      (Rel.) Home Phone Work Phone Mobile Phone    Nina Ruiz (Friend) [phone] -- --    Loly Lui (Friend) [phone] -- --              "

## (undated) DEVICE — RETRIEVAL BALLOON CATHETER: Brand: EXTRACTOR™ PRO RX

## (undated) DEVICE — SINGLE PORT MANIFOLD: Brand: NEPTUNE 2

## (undated) DEVICE — ST EXT IV SMARTSITE 2PC M LL 2.8ML 20IN

## (undated) DEVICE — SPHINCTEROTOME: Brand: HYDRATOME RX 44

## (undated) DEVICE — TRIPLE LUMEN ERCP CANNULA: Brand: TANDEM XL

## (undated) DEVICE — Device: Brand: MEDEX

## (undated) DEVICE — SNAR POLYP CAPTIFLX MICRO OVL 13MM 240CM

## (undated) DEVICE — Device: Brand: DEFENDO AIR/WATER/SUCTION AND BIOPSY VALVE

## (undated) DEVICE — GW JAG STR .025IN 450CM

## (undated) DEVICE — GW JAG STR .035IN 450CM